# Patient Record
Sex: FEMALE | Race: WHITE
[De-identification: names, ages, dates, MRNs, and addresses within clinical notes are randomized per-mention and may not be internally consistent; named-entity substitution may affect disease eponyms.]

---

## 2018-05-11 ENCOUNTER — HOSPITAL ENCOUNTER (INPATIENT)
Dept: HOSPITAL 82 - ED | Age: 78
LOS: 1 days | Discharge: HOME | DRG: 310 | End: 2018-05-12
Attending: INTERNAL MEDICINE | Admitting: INTERNAL MEDICINE
Payer: MEDICARE

## 2018-05-11 VITALS — BODY MASS INDEX: 39.81 KG/M2 | WEIGHT: 197.5 LBS | HEIGHT: 59 IN

## 2018-05-11 VITALS — SYSTOLIC BLOOD PRESSURE: 100 MMHG | DIASTOLIC BLOOD PRESSURE: 52 MMHG

## 2018-05-11 VITALS — SYSTOLIC BLOOD PRESSURE: 137 MMHG | DIASTOLIC BLOOD PRESSURE: 68 MMHG

## 2018-05-11 VITALS — DIASTOLIC BLOOD PRESSURE: 71 MMHG | SYSTOLIC BLOOD PRESSURE: 131 MMHG

## 2018-05-11 VITALS — DIASTOLIC BLOOD PRESSURE: 76 MMHG | SYSTOLIC BLOOD PRESSURE: 110 MMHG

## 2018-05-11 VITALS — SYSTOLIC BLOOD PRESSURE: 119 MMHG | DIASTOLIC BLOOD PRESSURE: 75 MMHG

## 2018-05-11 VITALS — SYSTOLIC BLOOD PRESSURE: 127 MMHG | DIASTOLIC BLOOD PRESSURE: 64 MMHG

## 2018-05-11 VITALS — DIASTOLIC BLOOD PRESSURE: 79 MMHG | SYSTOLIC BLOOD PRESSURE: 118 MMHG

## 2018-05-11 VITALS — DIASTOLIC BLOOD PRESSURE: 63 MMHG | SYSTOLIC BLOOD PRESSURE: 138 MMHG

## 2018-05-11 VITALS — DIASTOLIC BLOOD PRESSURE: 62 MMHG | SYSTOLIC BLOOD PRESSURE: 107 MMHG

## 2018-05-11 VITALS — DIASTOLIC BLOOD PRESSURE: 65 MMHG | SYSTOLIC BLOOD PRESSURE: 131 MMHG

## 2018-05-11 VITALS — SYSTOLIC BLOOD PRESSURE: 114 MMHG | DIASTOLIC BLOOD PRESSURE: 73 MMHG

## 2018-05-11 VITALS — DIASTOLIC BLOOD PRESSURE: 71 MMHG | SYSTOLIC BLOOD PRESSURE: 117 MMHG

## 2018-05-11 DIAGNOSIS — I48.0: Primary | ICD-10-CM

## 2018-05-11 DIAGNOSIS — K21.9: ICD-10-CM

## 2018-05-11 DIAGNOSIS — Z23: ICD-10-CM

## 2018-05-11 DIAGNOSIS — J11.1: ICD-10-CM

## 2018-05-11 DIAGNOSIS — I10: ICD-10-CM

## 2018-05-11 LAB
ALBUMIN SERPL-MCNC: 4.1 G/DL (ref 3.2–5)
ALP SERPL-CCNC: 96 U/L (ref 38–126)
ALT SERPL-CCNC: 27 U/L (ref 11–66)
ANION GAP SERPL CALCULATED.3IONS-SCNC: 17 MMOL/L
APTT PPP: 28.8 SECONDS (ref 20–32.5)
AST SERPL-CCNC: 23 U/L (ref 9–36)
BASOPHILS NFR BLD AUTO: 1 % (ref 0–3)
BUN SERPL-MCNC: 14 MG/DL (ref 8–23)
BUN/CREAT SERPL: 20
CHLORIDE SERPL-SCNC: 105 MMOL/L (ref 95–108)
CO2 SERPL-SCNC: 25 MMOL/L (ref 22–30)
CREAT SERPL-MCNC: 0.7 MG/DL (ref 0.5–1)
EOSINOPHIL NFR BLD AUTO: 1 % (ref 0–8)
ERYTHROCYTE [DISTWIDTH] IN BLOOD BY AUTOMATED COUNT: 14.1 % (ref 11.5–15.5)
HCT VFR BLD AUTO: 41.1 % (ref 37–47)
HGB BLD-MCNC: 13.4 G/DL (ref 12–16)
IMM GRANULOCYTES NFR BLD: 0.1 % (ref 0–1)
INR PPP: 0.9 RATIO (ref 0.7–1.3)
LYMPHOCYTES NFR BLD: 36 % (ref 15–41)
MCH RBC QN AUTO: 27.4 PG  CALC (ref 26–32)
MCHC RBC AUTO-ENTMCNC: 32.6 G/L CALC (ref 32–36)
MCV RBC AUTO: 84 FL  CALC (ref 80–100)
MONOCYTES NFR BLD AUTO: 5 % (ref 2–13)
MYOGLOBIN SERPL-MCNC: 37 NG/ML (ref 0–62)
NEUTROPHILS # BLD AUTO: 4 THOU/UL (ref 2–7.15)
NEUTROPHILS NFR BLD AUTO: 57 % (ref 42–76)
PLATELET # BLD AUTO: 337 THOU/UL (ref 130–400)
POTASSIUM SERPL-SCNC: 3.8 MMOL/L (ref 3.5–5.1)
PROT SERPL-MCNC: 7.6 G/DL (ref 6.3–8.2)
PROTHROMBIN TIME: 10.3 SECONDS (ref 9–12.5)
RBC # BLD AUTO: 4.89 MILL/UL (ref 4.2–5.6)
SODIUM SERPL-SCNC: 143 MMOL/L (ref 137–146)

## 2018-05-11 NOTE — NUR
PT OOB TO BSC WITH ONE MOD ASSIST, CALL BELL WITHIN REACH, INSTRUCTED TO CALL
FOR ASSIST WHEN COMPLETE

## 2018-05-11 NOTE — NUR
INTO SEE PATIENT, PLAN OF CARE DISCUSSED, OFFERS NO COMPLAINTS OF
PAIN, CALL BELL WITHIN REACH, EDUCATED REGARDING MEAL TIMES AND DINNER TRAY
ORDERED, WILL CONTINUE TO MONITOR.

## 2018-05-11 NOTE — NUR
PT. ASSISTED TO BSC.  WITH EXERTION, PT. HR. ELEVATED -110'S A-FIB.
REMAINS ON CARDIZEM DRIP AT 15 MG/HR. PT. AMBULATORY WITH MIN ASSIST TO BSC.
APPROX 250 CC URINE OUT AT THIS TIME.  DENIES COMPLAINTS OF PAIN OR NEED AT
THIS TIME.

## 2018-05-11 NOTE — NUR
PT. MEDICATED PER PROVIDER ORDERS.  PROVIDED WITH ORANGE SHERBERT.  WATER
FRESHED UP.  OFFERED BSC, DECLINES AT THIS TIME.  DENIES OTHER COMPLAINTS OR
NEEDS.

## 2018-05-11 NOTE — NUR
PT C/O NC TICKLING NOSE. O2 BUMPED DOWN TO 1L. 
DAUGHTER AT BEDSIDE. PT HAS A DNR THAT NEEDS SIGNATURES. DAUGHTER IS TAKING IT
HOME TO COMPLETE IT & WILL BRING IT BACK.

## 2018-05-11 NOTE — NUR
FRANCO CARE PROVIDED, PT REPOSITIONED FOR COMFORT AND SET UP ASSIST PROVIDED FRO
PM MEAL, CALL PITT WITHIN REACH

## 2018-05-11 NOTE — NUR
2ND IV ESTABLISHED. LABS DRAWN. EKG COMPLETE. PORT XR COMPLETE. PT STATES 955
O2 IS "GOOD FOR HER", PLACED ON 2L NC. PT STATES SHE IS UNDER "EXTRA STRESS AT
HOME". 
PT C/O CP, SOB, NAUSEA, VOMITING, INCREASED HR SINCE 0900 THIS AM. PT TOOK
NITRO x3, TYLENOL x2, 5MG VALIUM PRIOR TO EMS ARRIVAL. EMS GAVE 20MG CARDIZEM,
PLACED PT ON 4L NC. . PT DENIES PAIN SINCE CARDIZEM GIVEN BY EMS. PT
TALKATIVE/ALERT WITH STAFF. 1+ EDEMA TO BILATERAL LEGS. SKIN DRY/WARM.
BREATHING EVEN/UNLABORED. MILD CRACKLES AT BASE OF BOTH LUNGS. ABD OBESE,
SOFT/NONTENDER.

## 2018-05-11 NOTE — NUR
Admission Note
 
 
Report Given to:         LAVELLE
Transported by:           Wheelchair          X Stretcher
 
Transported with:        X Nurse     Transporter   X Patent IV   X O2
                         X Cardiac Monitor

## 2018-05-11 NOTE — NUR
FAMILY LEFT AT THIS TIME, PLACED PT ON BED PAN, PT ABLE TO ROLL WELL IN BED
ASKING TO STAY ON PAN FOR A FEW MINUTES, CALL BELL WITHIN REACH.

## 2018-05-11 NOTE — NUR
PT BACK TO BED WITH ONE MOD ASSIST, CONTINENT  ML YELLOW URINE, SELF
FRANCO CARE COMPLETED, REPOSITIONED FOR COMFORT, CALL BELL WITHIN REACH, WILL
CONTINUE TO MONITOR.
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
## 2018-05-11 NOTE — NUR
PT. FOUND AWAKE, ALERT, ORIENTED X 3.  SKIN WARM AND DRY.  EDWAR.  AFEBRILE.
PT. ASSISTED TO BSC.  APPROX 250 CC OUT AT THIS TIME.  MODERATE INCONTINENCE
OF URINE NOTED AT THIS TIME.  ALL LINENS AND GOWN CHANGED.

## 2018-05-11 NOTE — NUR
PT. RESTING IN BED WITH EYES CLOSED.  DENIES COMPLAINTS OF PAIN OR NEED.
RESPS EVEN AND UNLABORED.  SKIN WARM AND DRY.  REMAINS A-FIB RATE CONTROLLED
IN THE 70-90'S.  CARDIZEM DRIP CONTINUES AT 15 MG/HR.  WILL CONTINUE TO
MONITOR.

## 2018-05-11 NOTE — NUR
CARDIZEM DRIP INCREASED TO 15 MG/HR AT THIS TIME AS HR REMAINS IN THE UPPER
'S IN A-RIB.  WILL CONTINUE TO ASSESS FOR TITRATION NEEDS AND RHYTHM
CHANGES.  DENIES OTHER COMPLAINTS AT THIS TIME.  WILL NOTIFY PHYSICIAN OF
REQUEST FOR OXYBUTYNIN AND TYLENOL.

## 2018-05-12 VITALS — DIASTOLIC BLOOD PRESSURE: 77 MMHG | SYSTOLIC BLOOD PRESSURE: 119 MMHG

## 2018-05-12 VITALS — DIASTOLIC BLOOD PRESSURE: 65 MMHG | SYSTOLIC BLOOD PRESSURE: 117 MMHG

## 2018-05-12 VITALS — SYSTOLIC BLOOD PRESSURE: 127 MMHG | DIASTOLIC BLOOD PRESSURE: 79 MMHG

## 2018-05-12 VITALS — SYSTOLIC BLOOD PRESSURE: 128 MMHG | DIASTOLIC BLOOD PRESSURE: 70 MMHG

## 2018-05-12 VITALS — SYSTOLIC BLOOD PRESSURE: 143 MMHG | DIASTOLIC BLOOD PRESSURE: 62 MMHG

## 2018-05-12 VITALS — DIASTOLIC BLOOD PRESSURE: 70 MMHG | SYSTOLIC BLOOD PRESSURE: 137 MMHG

## 2018-05-12 VITALS — SYSTOLIC BLOOD PRESSURE: 115 MMHG | DIASTOLIC BLOOD PRESSURE: 65 MMHG

## 2018-05-12 VITALS — SYSTOLIC BLOOD PRESSURE: 112 MMHG | DIASTOLIC BLOOD PRESSURE: 66 MMHG

## 2018-05-12 VITALS — DIASTOLIC BLOOD PRESSURE: 68 MMHG | SYSTOLIC BLOOD PRESSURE: 122 MMHG

## 2018-05-12 VITALS — DIASTOLIC BLOOD PRESSURE: 73 MMHG | SYSTOLIC BLOOD PRESSURE: 106 MMHG

## 2018-05-12 VITALS — DIASTOLIC BLOOD PRESSURE: 63 MMHG | SYSTOLIC BLOOD PRESSURE: 119 MMHG

## 2018-05-12 LAB
ALBUMIN SERPL-MCNC: 3.5 G/DL (ref 3.2–5)
ALP SERPL-CCNC: 94 U/L (ref 38–126)
ALT SERPL-CCNC: 25 U/L (ref 11–66)
ANION GAP SERPL CALCULATED.3IONS-SCNC: 15 MMOL/L
AST SERPL-CCNC: 19 U/L (ref 9–36)
BASOPHILS NFR BLD AUTO: 1 % (ref 0–3)
BASOPHILS NFR BLD AUTO: 1 % (ref 0–3)
BUN SERPL-MCNC: 12 MG/DL (ref 8–23)
BUN/CREAT SERPL: 20
CHLORIDE SERPL-SCNC: 106 MMOL/L (ref 95–108)
CO2 SERPL-SCNC: 25 MMOL/L (ref 22–30)
CREAT SERPL-MCNC: 0.6 MG/DL (ref 0.5–1)
EOSINOPHIL NFR BLD AUTO: 1 % (ref 0–8)
EOSINOPHIL NFR BLD AUTO: 2 % (ref 0–8)
ERYTHROCYTE [DISTWIDTH] IN BLOOD BY AUTOMATED COUNT: 14.3 % (ref 11.5–15.5)
ERYTHROCYTE [DISTWIDTH] IN BLOOD BY AUTOMATED COUNT: 14.4 % (ref 11.5–15.5)
HCT VFR BLD AUTO: 39 % (ref 37–47)
HCT VFR BLD AUTO: 39.9 % (ref 37–47)
HGB BLD-MCNC: 12.5 G/DL (ref 12–16)
HGB BLD-MCNC: 12.6 G/DL (ref 12–16)
IMM GRANULOCYTES NFR BLD: 0.2 % (ref 0–1)
IMM GRANULOCYTES NFR BLD: 0.2 % (ref 0–1)
LYMPHOCYTES NFR BLD: 43 % (ref 15–41)
LYMPHOCYTES NFR BLD: 44 % (ref 15–41)
MCH RBC QN AUTO: 27.2 PG  CALC (ref 26–32)
MCH RBC QN AUTO: 27.3 PG  CALC (ref 26–32)
MCHC RBC AUTO-ENTMCNC: 31.6 G/L CALC (ref 32–36)
MCHC RBC AUTO-ENTMCNC: 32.1 G/L CALC (ref 32–36)
MCV RBC AUTO: 85.2 FL  CALC (ref 80–100)
MCV RBC AUTO: 86 FL  CALC (ref 80–100)
MONOCYTES NFR BLD AUTO: 5 % (ref 2–13)
MONOCYTES NFR BLD AUTO: 6 % (ref 2–13)
NEUTROPHILS # BLD AUTO: 2.72 THOU/UL (ref 2–7.15)
NEUTROPHILS # BLD AUTO: 2.87 THOU/UL (ref 2–7.15)
NEUTROPHILS NFR BLD AUTO: 48 % (ref 42–76)
NEUTROPHILS NFR BLD AUTO: 50 % (ref 42–76)
PLATELET # BLD AUTO: 305 THOU/UL (ref 130–400)
PLATELET # BLD AUTO: 318 THOU/UL (ref 130–400)
POTASSIUM SERPL-SCNC: 3.8 MMOL/L (ref 3.5–5.1)
PROT SERPL-MCNC: 6.4 G/DL (ref 6.3–8.2)
RBC # BLD AUTO: 4.58 MILL/UL (ref 4.2–5.6)
RBC # BLD AUTO: 4.64 MILL/UL (ref 4.2–5.6)
SODIUM SERPL-SCNC: 142 MMOL/L (ref 137–146)

## 2018-05-12 PROCEDURE — 3E0234Z INTRODUCTION OF SERUM, TOXOID AND VACCINE INTO MUSCLE, PERCUTANEOUS APPROACH: ICD-10-PCS | Performed by: INTERNAL MEDICINE

## 2018-05-12 NOTE — NUR
PT IS AWAKE, ALERT, ORIENTED X 3.  MONITOR SHOWS SINUS RHYTHM WITHOUT ECTOPY,
RATE OF 70.  LUNGS CLEAR, ABDOMEN SOFT AND NONTENDER, PALPABLE PEDAL PULSES
WITH MINIMAL EDEMA.  PT SEEN BY JOE OGDEN THIS MORNING, PLAN OF CARE
REVIEWED.

## 2018-05-12 NOTE — NUR
PT HAS BEEN DISCHARGED TO HOME.  IVs WERE DISCONTINUED, MONITORS REMOVED.  PT
VERBALIZED UNDERSTANDING OF DC INSTRUCTIONS, TAKEN TO VEHICLE BY WHEELCHAIR.
PT LEAVES IN STABLE CONDITION, NSR.

## 2018-05-12 NOTE — NUR
PT SEEN BY DR KRISHNAMURTHY THIS AM, PLAN IS TO DISCHARGE PT HOME AS PER REGULAR
RHYTHM AND NO NEED FOR ANTICOAGULATION.  PT PLEASED AS SHE CARES FOR HER
AILING  AT HOME.  NO DISTRESS, NO COMPLAINTS.

## 2018-05-12 NOTE — NUR
LOPRESSOR PROVIDED THIS MORNING, THEN CARDIZEM DRIP WAS DISCONTINUED.  PT HAS
REMAINED IN SINUS RHYTHM, RATE 62.  PT HAS ALSO WASHED UP THIS MORNING, GOWN
CHANGED.

## 2018-05-12 NOTE — NUR
PT. ASSISTED TO BSC.  APPROX 200 CC URINE OUT AT THIS TIME.  MEDICATED FOR
6/10 BACK PAIN.  WILL CONTINUE TO MONITOR.  REMAINS SINUS RHTYHM AT THIS TIME.

## 2019-06-10 ENCOUNTER — HOSPITAL ENCOUNTER (OUTPATIENT)
Dept: HOSPITAL 82 - ED | Age: 79
Setting detail: OBSERVATION
LOS: 1 days | Discharge: HOME | End: 2019-06-11
Attending: INTERNAL MEDICINE | Admitting: INTERNAL MEDICINE
Payer: COMMERCIAL

## 2019-06-10 VITALS — DIASTOLIC BLOOD PRESSURE: 83 MMHG | SYSTOLIC BLOOD PRESSURE: 142 MMHG

## 2019-06-10 VITALS — DIASTOLIC BLOOD PRESSURE: 74 MMHG | SYSTOLIC BLOOD PRESSURE: 101 MMHG

## 2019-06-10 VITALS — DIASTOLIC BLOOD PRESSURE: 63 MMHG | SYSTOLIC BLOOD PRESSURE: 116 MMHG

## 2019-06-10 VITALS — BODY MASS INDEX: 37.56 KG/M2 | WEIGHT: 186.29 LBS | HEIGHT: 59 IN

## 2019-06-10 VITALS — SYSTOLIC BLOOD PRESSURE: 110 MMHG | DIASTOLIC BLOOD PRESSURE: 65 MMHG

## 2019-06-10 VITALS — DIASTOLIC BLOOD PRESSURE: 67 MMHG | SYSTOLIC BLOOD PRESSURE: 147 MMHG

## 2019-06-10 VITALS — SYSTOLIC BLOOD PRESSURE: 108 MMHG | DIASTOLIC BLOOD PRESSURE: 62 MMHG

## 2019-06-10 VITALS — SYSTOLIC BLOOD PRESSURE: 132 MMHG | DIASTOLIC BLOOD PRESSURE: 72 MMHG

## 2019-06-10 VITALS — DIASTOLIC BLOOD PRESSURE: 68 MMHG | SYSTOLIC BLOOD PRESSURE: 106 MMHG

## 2019-06-10 VITALS — DIASTOLIC BLOOD PRESSURE: 56 MMHG | SYSTOLIC BLOOD PRESSURE: 114 MMHG

## 2019-06-10 VITALS — DIASTOLIC BLOOD PRESSURE: 63 MMHG | SYSTOLIC BLOOD PRESSURE: 140 MMHG

## 2019-06-10 VITALS — DIASTOLIC BLOOD PRESSURE: 59 MMHG | SYSTOLIC BLOOD PRESSURE: 127 MMHG

## 2019-06-10 VITALS — DIASTOLIC BLOOD PRESSURE: 75 MMHG | SYSTOLIC BLOOD PRESSURE: 154 MMHG

## 2019-06-10 VITALS — DIASTOLIC BLOOD PRESSURE: 64 MMHG | SYSTOLIC BLOOD PRESSURE: 110 MMHG

## 2019-06-10 VITALS — SYSTOLIC BLOOD PRESSURE: 144 MMHG | DIASTOLIC BLOOD PRESSURE: 61 MMHG

## 2019-06-10 VITALS — SYSTOLIC BLOOD PRESSURE: 119 MMHG | DIASTOLIC BLOOD PRESSURE: 60 MMHG

## 2019-06-10 VITALS — SYSTOLIC BLOOD PRESSURE: 115 MMHG | DIASTOLIC BLOOD PRESSURE: 58 MMHG

## 2019-06-10 DIAGNOSIS — I10: ICD-10-CM

## 2019-06-10 DIAGNOSIS — E66.9: ICD-10-CM

## 2019-06-10 DIAGNOSIS — R53.1: ICD-10-CM

## 2019-06-10 DIAGNOSIS — K21.9: ICD-10-CM

## 2019-06-10 DIAGNOSIS — M19.90: ICD-10-CM

## 2019-06-10 DIAGNOSIS — I48.0: Primary | ICD-10-CM

## 2019-06-10 DIAGNOSIS — Z98.890: ICD-10-CM

## 2019-06-10 DIAGNOSIS — R07.9: ICD-10-CM

## 2019-06-10 LAB
ANION GAP SERPL CALCULATED.3IONS-SCNC: 15 MMOL/L
BASOPHILS NFR BLD AUTO: 0 % (ref 0–3)
BUN SERPL-MCNC: 10 MG/DL (ref 8–23)
BUN/CREAT SERPL: 16
CHLORIDE SERPL-SCNC: 106 MMOL/L (ref 95–108)
CO2 SERPL-SCNC: 24 MMOL/L (ref 22–30)
CREAT SERPL-MCNC: 0.6 MG/DL (ref 0.5–1)
EOSINOPHIL NFR BLD AUTO: 1 % (ref 0–8)
ERYTHROCYTE [DISTWIDTH] IN BLOOD BY AUTOMATED COUNT: 13.8 % (ref 11.5–15.5)
HCT VFR BLD AUTO: 40.2 % (ref 37–47)
HGB BLD-MCNC: 12.9 G/DL (ref 12–16)
IMM GRANULOCYTES NFR BLD: 0.3 % (ref 0–5)
LYMPHOCYTES NFR BLD: 34 % (ref 15–41)
MCH RBC QN AUTO: 26.8 PG  CALC (ref 26–32)
MCHC RBC AUTO-ENTMCNC: 32.1 G/L CALC (ref 32–36)
MCV RBC AUTO: 83.6 FL  CALC (ref 80–100)
MONOCYTES NFR BLD AUTO: 6 % (ref 2–13)
NEUTROPHILS # BLD AUTO: 4.19 THOU/UL (ref 2–7.15)
NEUTROPHILS NFR BLD AUTO: 59 % (ref 42–76)
PLATELET # BLD AUTO: 338 THOU/UL (ref 130–400)
POTASSIUM SERPL-SCNC: 3.6 MMOL/L (ref 3.5–5.1)
RBC # BLD AUTO: 4.81 MILL/UL (ref 4.2–5.6)
SODIUM SERPL-SCNC: 142 MMOL/L (ref 137–146)

## 2019-06-10 NOTE — NUR
PT TO ICU BED 1 VIA STRETCHER ACCOMPANIED BY ER NURSE. PT TRANSFERRED TO BED
WITH STAND BY ASSIST. ADDMISSION ASSESSMENT COMPLETED AT THIS TIME. PT IS
ALERT AND ORIENTED X3. ORIENTED TO ROOM AND UNIT AND PLACED ON ALL MONITORS.
PT STATES THAT  IS IN HOSPITAL AND HAS AN ACTIVE RESTRAINING ORDER
AGAINST  AND WANTS TO MAKE IT KNOWN THAT HE IS NOT TO COME AND VISIT.
EXPALINED THAT I WILL PASS ON IN REPORT. CALL LIGHT IN REACH. WILL CONTINUE TO
MONITOR.

## 2019-06-10 NOTE — NUR
ASSESSMENT COMPLETE. PT UP TO BSC X 1 PERSON ASSIST; INCONTINENT OF LARGE
AMOUNT OF URINE; USES PULL UP BRIEF. STATES THAT HEADACHE IS BETTER AND DENIES
PAIN CURRENTLY. RESPIRATIONS EVEN AND UNLABORED ON ROOM AIR. CARDIZEM INFUSING
AT 5MG/HR ALONG WITH MAINENANCE FLUIDS; EMS IV SITE APPEARS HEALTHY TO LAC.
ASSESSMENT NEGATIVE EXCEPT FOR IRREGULAR HEART RATE AND TENDERNESS TO LEGS
WITH PALPATION. PLAN OF CARE REVIEWED. PT ENCOURAGED TO VERBALIZE CONCERNS.
STATES UNDERSTANDING. SAFETY MEASURES IN PLACE. CALL LIGHT WITHIN REACH.

## 2019-06-10 NOTE — NUR
VS STABLE; MONITORING HOURLY. PT TALKATIVE ABOUT FAMILY. ASSISTED WITH
REPOSITIONING FOR COMFORT. ONLY REQUEST IS FOR WATER AT BEDSIDE FOR DRY MOUTH
R/T HOME MEDICATION.

## 2019-06-10 NOTE — NUR
PT REQUESTING TO SIT UP IN HIGHER IN THE BED STATING THAT SHE ONLY SLEEPS A
FEW HOURS AT A TIME. DENIES PAIN. RESPIRATIONS EVEN AND UNLABORED ON ROOM AIR.
REMAINS AFIB ON TELEMETRY. CARDIZEM CONTINUES TO INFUSE AT 5MG/HR; IV SITE
APPEARS HEALTHY. SAFETY MEASURES IN PLACE. CALL LIGHT WITHIN REACH.

## 2019-06-11 VITALS — DIASTOLIC BLOOD PRESSURE: 56 MMHG | SYSTOLIC BLOOD PRESSURE: 162 MMHG

## 2019-06-11 VITALS — DIASTOLIC BLOOD PRESSURE: 58 MMHG | SYSTOLIC BLOOD PRESSURE: 119 MMHG

## 2019-06-11 VITALS — DIASTOLIC BLOOD PRESSURE: 71 MMHG | SYSTOLIC BLOOD PRESSURE: 139 MMHG

## 2019-06-11 VITALS — DIASTOLIC BLOOD PRESSURE: 75 MMHG | SYSTOLIC BLOOD PRESSURE: 143 MMHG

## 2019-06-11 VITALS — DIASTOLIC BLOOD PRESSURE: 64 MMHG | SYSTOLIC BLOOD PRESSURE: 133 MMHG

## 2019-06-11 VITALS — DIASTOLIC BLOOD PRESSURE: 66 MMHG | SYSTOLIC BLOOD PRESSURE: 123 MMHG

## 2019-06-11 VITALS — DIASTOLIC BLOOD PRESSURE: 71 MMHG | SYSTOLIC BLOOD PRESSURE: 146 MMHG

## 2019-06-11 VITALS — SYSTOLIC BLOOD PRESSURE: 142 MMHG | DIASTOLIC BLOOD PRESSURE: 75 MMHG

## 2019-06-11 VITALS — SYSTOLIC BLOOD PRESSURE: 127 MMHG | DIASTOLIC BLOOD PRESSURE: 56 MMHG

## 2019-06-11 VITALS — SYSTOLIC BLOOD PRESSURE: 118 MMHG | DIASTOLIC BLOOD PRESSURE: 63 MMHG

## 2019-06-11 VITALS — SYSTOLIC BLOOD PRESSURE: 123 MMHG | DIASTOLIC BLOOD PRESSURE: 62 MMHG

## 2019-06-11 VITALS — DIASTOLIC BLOOD PRESSURE: 63 MMHG | SYSTOLIC BLOOD PRESSURE: 120 MMHG

## 2019-06-11 VITALS — DIASTOLIC BLOOD PRESSURE: 59 MMHG | SYSTOLIC BLOOD PRESSURE: 115 MMHG

## 2019-06-11 VITALS — DIASTOLIC BLOOD PRESSURE: 61 MMHG | SYSTOLIC BLOOD PRESSURE: 115 MMHG

## 2019-06-11 VITALS — DIASTOLIC BLOOD PRESSURE: 58 MMHG | SYSTOLIC BLOOD PRESSURE: 133 MMHG

## 2019-06-11 VITALS — DIASTOLIC BLOOD PRESSURE: 73 MMHG | SYSTOLIC BLOOD PRESSURE: 169 MMHG

## 2019-06-11 VITALS — DIASTOLIC BLOOD PRESSURE: 65 MMHG | SYSTOLIC BLOOD PRESSURE: 148 MMHG

## 2019-06-11 VITALS — SYSTOLIC BLOOD PRESSURE: 118 MMHG | DIASTOLIC BLOOD PRESSURE: 60 MMHG

## 2019-06-11 LAB
ALBUMIN SERPL-MCNC: 3.8 G/DL (ref 3.2–5)
ALP SERPL-CCNC: 74 U/L (ref 38–126)
ANION GAP SERPL CALCULATED.3IONS-SCNC: 13 MMOL/L
AST SERPL-CCNC: 13 U/L (ref 9–36)
BASOPHILS NFR BLD AUTO: 1 % (ref 0–3)
BUN SERPL-MCNC: 14 MG/DL (ref 8–23)
BUN/CREAT SERPL: 20
CHLORIDE SERPL-SCNC: 107 MMOL/L (ref 95–108)
CO2 SERPL-SCNC: 25 MMOL/L (ref 22–30)
CREAT SERPL-MCNC: 0.7 MG/DL (ref 0.5–1)
EOSINOPHIL NFR BLD AUTO: 1 % (ref 0–8)
ERYTHROCYTE [DISTWIDTH] IN BLOOD BY AUTOMATED COUNT: 14 % (ref 11.5–15.5)
HCT VFR BLD AUTO: 37 % (ref 37–47)
HGB BLD-MCNC: 11.6 G/DL (ref 12–16)
IMM GRANULOCYTES NFR BLD: 0.2 % (ref 0–5)
LYMPHOCYTES NFR BLD: 37 % (ref 15–41)
MAGNESIUM SERPL-MCNC: 1.9 MG/DL (ref 1.6–2.3)
MCH RBC QN AUTO: 26.6 PG  CALC (ref 26–32)
MCHC RBC AUTO-ENTMCNC: 31.4 G/L CALC (ref 32–36)
MCV RBC AUTO: 84.9 FL  CALC (ref 80–100)
MONOCYTES NFR BLD AUTO: 6 % (ref 2–13)
NEUTROPHILS # BLD AUTO: 3.18 THOU/UL (ref 2–7.15)
NEUTROPHILS NFR BLD AUTO: 55 % (ref 42–76)
PLATELET # BLD AUTO: 301 THOU/UL (ref 130–400)
POTASSIUM SERPL-SCNC: 4.3 MMOL/L (ref 3.5–5.1)
PROT SERPL-MCNC: 6.2 G/DL (ref 6.3–8.2)
RBC # BLD AUTO: 4.36 MILL/UL (ref 4.2–5.6)
SODIUM SERPL-SCNC: 140 MMOL/L (ref 137–146)

## 2019-06-11 NOTE — NUR
IV DC'D, TIP INTACT, DRESSING APPLIED. PT STATES HER SON WONT BE ABLE TO COME
GET HER UNTIL NOON. PT LEFT ON MONITORS AT THIS TIME. PT IS VERY TALKATIVE.

## 2019-06-11 NOTE — NUR
PT ASLEEP WITH NO SIGNS OF DISTRESS. RESPIRATIONS EVEN AND UNLABORED ON ROOM
AIR. 95% ON ROOM AIR. VS STABLE. SINUS RHYTHM ON TELE. SAFETY MEASURES IN
PLACE. CALL LIGHT WITHIN REACH.

## 2019-06-11 NOTE — NUR
INCONTINENT OF LARGE AMOUNT OF URINE; ASSISTED TO BSC FOR HYGIENE. PT IS ONE
PERSON ASSIST; VERY SLOW WITH MOVEMENTS; REPORTS THAT SHE USES A WHEELCHAIR
AND WALKER AT HOME. STATES THAT SHE HAS ONLY FALLEN ONCE IN THE LAST 3 YEARS.

## 2019-06-11 NOTE — NUR
PT SITTING ON SIDE OF BED, EATING LUNCH. CHANGED FROM GOWN TO PERSONAL CLOTHES
WITH MINIMAL HELP. EDUCATED PT ON DC INSTRUCTIONS, INCLUDING NEW RX & STOP
LISINOPRIL, & F/UP WITH CARDIO. PT VERBALIZED UNDERSTANDING.

## 2019-06-11 NOTE — NUR
PT A&Ox4. PLEASANT, COOPERATIVE. DENIES ANY PAIN. KING, BUT IS SLOW MOVING.
STRONG PULSES. OBESE BUT NO EDEMA. -3 SEC CAP REFILL. SR ON TELE @63. SPEECH
CLEAR. EYES PERRLA @3. ABD SOFT/NONTENDER. DENIES N/V/D. ACTIVE BS. BREATHING
EVEN/UNLABORED, LUNGS CTA. PT STATES SHE IS READY TO GO HOME TODAY. PT GIVEN
DR LUND'S INFORMATION.

## 2019-06-11 NOTE — NUR
PT SITTING UP IN BED WATCHING TV; ALERT AND ORIENTED. DENIES PAIN.
RESPIRATIONS EVEN AND UNLABORED ON ROOM AIR. NO REQUESTS OR CONCERNS AT THIS
TIME. SAFETY MEASURES IN PLACE. CALL LIGHT WITHIN REACH.

## 2019-11-13 ENCOUNTER — HOSPITAL ENCOUNTER (EMERGENCY)
Dept: HOSPITAL 82 - ED | Age: 79
Discharge: HOME | End: 2019-11-13
Payer: MEDICAID

## 2019-11-13 VITALS — SYSTOLIC BLOOD PRESSURE: 147 MMHG | DIASTOLIC BLOOD PRESSURE: 88 MMHG

## 2019-11-13 VITALS — WEIGHT: 143.3 LBS | BODY MASS INDEX: 28.89 KG/M2 | HEIGHT: 59 IN

## 2019-11-13 DIAGNOSIS — N32.81: Primary | ICD-10-CM

## 2019-11-13 DIAGNOSIS — I10: ICD-10-CM

## 2019-11-13 DIAGNOSIS — K05.10: ICD-10-CM

## 2019-11-13 LAB
BILIRUB UR QL STRIP.AUTO: NEGATIVE
COLOR UR AUTO: YELLOW
GLUCOSE UR STRIP.AUTO-MCNC: NEGATIVE MG/DL
HGB UR QL STRIP.AUTO: NEGATIVE
KETONES UR STRIP.AUTO-MCNC: NEGATIVE MG/DL
LEUKOCYTE ESTERASE UR QL STRIP.AUTO: NEGATIVE
NITRITE UR QL STRIP.AUTO: NEGATIVE
PH UR STRIP.AUTO: 6 [PH] (ref 4.5–8)
PROT UR QL STRIP.AUTO: NEGATIVE MG/DL
SP GR UR STRIP.AUTO: 1.02
UROBILINOGEN UR QL STRIP.AUTO: 0.2 E.U./DL

## 2020-07-31 ENCOUNTER — HOSPITAL ENCOUNTER (OUTPATIENT)
Dept: HOSPITAL 82 - ORM | Age: 80
Discharge: HOME | End: 2020-07-31
Payer: MEDICARE

## 2020-07-31 VITALS — HEIGHT: 59 IN | WEIGHT: 185.19 LBS | BODY MASS INDEX: 37.33 KG/M2

## 2020-07-31 VITALS — DIASTOLIC BLOOD PRESSURE: 80 MMHG | SYSTOLIC BLOOD PRESSURE: 177 MMHG

## 2020-07-31 DIAGNOSIS — Z79.899: ICD-10-CM

## 2020-07-31 DIAGNOSIS — Z87.440: ICD-10-CM

## 2020-07-31 DIAGNOSIS — N39.46: Primary | ICD-10-CM

## 2020-07-31 DIAGNOSIS — Z11.59: ICD-10-CM

## 2020-07-31 DIAGNOSIS — E66.9: ICD-10-CM

## 2020-07-31 DIAGNOSIS — N32.81: ICD-10-CM

## 2020-07-31 PROCEDURE — 3E0K8GC INTRODUCTION OF OTHER THERAPEUTIC SUBSTANCE INTO GENITOURINARY TRACT, VIA NATURAL OR ARTIFICIAL OPENING ENDOSCOPIC: ICD-10-PCS

## 2020-08-14 ENCOUNTER — HOSPITAL ENCOUNTER (EMERGENCY)
Dept: HOSPITAL 82 - ED | Age: 80
Discharge: HOME | End: 2020-08-14
Payer: MEDICARE

## 2020-08-14 VITALS — BODY MASS INDEX: 37.78 KG/M2 | HEIGHT: 59 IN | WEIGHT: 187.39 LBS

## 2020-08-14 VITALS — SYSTOLIC BLOOD PRESSURE: 166 MMHG | DIASTOLIC BLOOD PRESSURE: 72 MMHG

## 2020-08-14 DIAGNOSIS — I10: ICD-10-CM

## 2020-08-14 DIAGNOSIS — B96.5: ICD-10-CM

## 2020-08-14 DIAGNOSIS — N39.0: Primary | ICD-10-CM

## 2020-08-14 DIAGNOSIS — R06.02: ICD-10-CM

## 2020-08-14 DIAGNOSIS — Z20.828: ICD-10-CM

## 2020-08-14 LAB
ALBUMIN SERPL-MCNC: 4.3 G/DL (ref 3.2–5)
ALP SERPL-CCNC: 155 U/L (ref 38–126)
ANION GAP SERPL CALCULATED.3IONS-SCNC: 13 MMOL/L
AST SERPL-CCNC: 29 U/L (ref 9–36)
BASOPHILS NFR BLD AUTO: 0 % (ref 0–3)
BILIRUB UR QL STRIP.AUTO: NEGATIVE
BUN SERPL-MCNC: 14 MG/DL (ref 8–23)
BUN/CREAT SERPL: 22
CHLORIDE SERPL-SCNC: 105 MMOL/L (ref 95–108)
CO2 SERPL-SCNC: 23 MMOL/L (ref 22–30)
COLOR UR AUTO: YELLOW
CREAT SERPL-MCNC: 0.6 MG/DL (ref 0.5–1)
EOSINOPHIL NFR BLD AUTO: 1 % (ref 0–8)
ERYTHROCYTE [DISTWIDTH] IN BLOOD BY AUTOMATED COUNT: 14.3 % (ref 11.5–15.5)
GLUCOSE UR STRIP.AUTO-MCNC: NEGATIVE MG/DL
HCT VFR BLD AUTO: 39.1 % (ref 37–47)
HGB BLD-MCNC: 12 G/DL (ref 12–16)
HGB UR QL STRIP.AUTO: (no result)
IMM GRANULOCYTES NFR BLD: 0.6 % (ref 0–5)
KETONES UR STRIP.AUTO-MCNC: NEGATIVE MG/DL
LEUKOCYTE ESTERASE UR QL STRIP.AUTO: NEGATIVE
LYMPHOCYTES NFR BLD: 26 % (ref 15–41)
MCH RBC QN AUTO: 26 PG  CALC (ref 26–32)
MCHC RBC AUTO-ENTMCNC: 30.7 G/DL CAL (ref 32–36)
MCV RBC AUTO: 84.8 FL  CALC (ref 80–100)
MONOCYTES NFR BLD AUTO: 5 % (ref 2–13)
NEUTROPHILS # BLD AUTO: 6.45 THOU/UL (ref 2–7.15)
NEUTROPHILS NFR BLD AUTO: 67 % (ref 42–76)
NITRITE UR QL STRIP.AUTO: NEGATIVE
PH UR STRIP.AUTO: 5.5 [PH] (ref 4.5–8)
PLATELET # BLD AUTO: 385 THOU/UL (ref 130–400)
POTASSIUM SERPL-SCNC: 3.8 MMOL/L (ref 3.5–5.1)
PROT SERPL-MCNC: 7.2 G/DL (ref 6.3–8.2)
PROT UR QL STRIP.AUTO: NEGATIVE MG/DL
RBC # BLD AUTO: 4.61 MILL/UL (ref 4.2–5.6)
SODIUM SERPL-SCNC: 137 MMOL/L (ref 137–146)
SP GR UR STRIP.AUTO: 1.02
UROBILINOGEN UR QL STRIP.AUTO: 0.2 E.U./DL

## 2020-09-13 ENCOUNTER — HOSPITAL ENCOUNTER (INPATIENT)
Dept: HOSPITAL 82 - ED | Age: 80
LOS: 2 days | Discharge: HOME | DRG: 309 | End: 2020-09-15
Attending: INTERNAL MEDICINE | Admitting: INTERNAL MEDICINE
Payer: MEDICARE

## 2020-09-13 VITALS — SYSTOLIC BLOOD PRESSURE: 126 MMHG | DIASTOLIC BLOOD PRESSURE: 74 MMHG

## 2020-09-13 VITALS — BODY MASS INDEX: 41.93 KG/M2 | HEIGHT: 59 IN | WEIGHT: 208 LBS

## 2020-09-13 VITALS — DIASTOLIC BLOOD PRESSURE: 73 MMHG | SYSTOLIC BLOOD PRESSURE: 121 MMHG

## 2020-09-13 DIAGNOSIS — E11.9: ICD-10-CM

## 2020-09-13 DIAGNOSIS — I48.0: Primary | ICD-10-CM

## 2020-09-13 DIAGNOSIS — Z20.828: ICD-10-CM

## 2020-09-13 DIAGNOSIS — K21.9: ICD-10-CM

## 2020-09-13 DIAGNOSIS — Z91.128: ICD-10-CM

## 2020-09-13 DIAGNOSIS — N39.0: ICD-10-CM

## 2020-09-13 DIAGNOSIS — L03.115: ICD-10-CM

## 2020-09-13 DIAGNOSIS — T50.996A: ICD-10-CM

## 2020-09-13 DIAGNOSIS — I10: ICD-10-CM

## 2020-09-13 DIAGNOSIS — L03.116: ICD-10-CM

## 2020-09-13 DIAGNOSIS — E87.6: ICD-10-CM

## 2020-09-13 DIAGNOSIS — I87.2: ICD-10-CM

## 2020-09-13 LAB
ALBUMIN SERPL-MCNC: 4.3 G/DL (ref 3.2–5)
ALP SERPL-CCNC: 117 U/L (ref 38–126)
ANION GAP SERPL CALCULATED.3IONS-SCNC: 13 MMOL/L
APTT PPP: 26.2 SECONDS (ref 20–32.5)
AST SERPL-CCNC: 26 U/L (ref 9–36)
BASOPHILS NFR BLD AUTO: 1 % (ref 0–3)
BUN SERPL-MCNC: 13 MG/DL (ref 8–23)
BUN/CREAT SERPL: 19
CHLORIDE SERPL-SCNC: 109 MMOL/L (ref 95–108)
CO2 SERPL-SCNC: 24 MMOL/L (ref 22–30)
CREAT SERPL-MCNC: 0.7 MG/DL (ref 0.5–1)
EOSINOPHIL NFR BLD AUTO: 2 % (ref 0–8)
ERYTHROCYTE [DISTWIDTH] IN BLOOD BY AUTOMATED COUNT: 14.1 % (ref 11.5–15.5)
HCT VFR BLD AUTO: 41.9 % (ref 37–47)
HGB BLD-MCNC: 12.8 G/DL (ref 12–16)
IMM GRANULOCYTES NFR BLD: 0.3 % (ref 0–5)
INR PPP: 1 RATIO (ref 0.7–1.3)
LYMPHOCYTES NFR BLD: 29 % (ref 15–41)
MCH RBC QN AUTO: 25.9 PG  CALC (ref 26–32)
MCHC RBC AUTO-ENTMCNC: 30.5 G/DL CAL (ref 32–36)
MCV RBC AUTO: 84.6 FL  CALC (ref 80–100)
MONOCYTES NFR BLD AUTO: 6 % (ref 2–13)
MYOGLOBIN SERPL-MCNC: 22 NG/ML (ref 0–62)
NEUTROPHILS # BLD AUTO: 4.87 THOU/UL (ref 2–7.15)
NEUTROPHILS NFR BLD AUTO: 63 % (ref 42–76)
PLATELET # BLD AUTO: 309 THOU/UL (ref 130–400)
POTASSIUM SERPL-SCNC: 3.4 MMOL/L (ref 3.5–5.1)
PROT SERPL-MCNC: 7.4 G/DL (ref 6.3–8.2)
PROTHROMBIN TIME: 9.9 SECONDS (ref 9–12.5)
RBC # BLD AUTO: 4.95 MILL/UL (ref 4.2–5.6)
SODIUM SERPL-SCNC: 142 MMOL/L (ref 137–146)

## 2020-09-13 NOTE — NUR
Admission Note
 
Report Given to:         MARY ELLEN MARTINEZ
Transported by:           Wheelchair          X Stretcher
 
Transported with:        X Nurse     Transporter   X Patent IV    O2
                         X Cardiac Monitor
 
Location:                X ICU       MS2

## 2020-09-13 NOTE — NUR
ADMISSION AND PHYSICAL ASSESMENT COMPLETE. PLAN OF CARE REVIEWED. PT
VERBALIZES UNDERSTANDING AND DENIES QUESTIONS. WHEN ASKED PT REQUEST WATER AND
EXTRA BLANKETS. PROVIDED PER PTS REQUEST. PT DENIES FURTHER NEEDS AT THIS
TIME. ITEMS WITHIN REACH. BED LOCKED AND IN LOW POSITION W/ BED RAILS UP X2.
CALL BELL WITHIN REACH, AGREES TO CALL PRN.

## 2020-09-13 NOTE — NUR
PT ARRIVES TO UNIT VIA STRETCHER, ACCOMPANIED BY ARELI LACY RN. PT AMBULATORY
TO BED W/ WALKER AND ASSIST OF 1. GAIT IS WEAK AND UNSTEADY. PT AGREES TO USE
CALL PITT AND NOT ATTEMPT TO GET OOB ALONE. PT ORIENTED TO ROOM, BED CONTROLS,
TV/LIGHT/CALL BELL SYSTEM. CALL BELL WITHIN REACH, AGREES TO CALL PRN.
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
## 2020-09-13 NOTE — NUR
PATIENT TO ROOM 10 VIA EMS STRETCHER. UNDRESSED INTO A GOWN. TRIAGE COMPLETED
AT BEDSIDE. PATIENT PLACED ON MONITOR. MD AT BEDSIDE FOR EVAL.

## 2020-09-14 VITALS — SYSTOLIC BLOOD PRESSURE: 125 MMHG | DIASTOLIC BLOOD PRESSURE: 58 MMHG

## 2020-09-14 VITALS — SYSTOLIC BLOOD PRESSURE: 112 MMHG | DIASTOLIC BLOOD PRESSURE: 70 MMHG

## 2020-09-14 VITALS — DIASTOLIC BLOOD PRESSURE: 64 MMHG | SYSTOLIC BLOOD PRESSURE: 104 MMHG

## 2020-09-14 VITALS — SYSTOLIC BLOOD PRESSURE: 108 MMHG | DIASTOLIC BLOOD PRESSURE: 59 MMHG

## 2020-09-14 VITALS — DIASTOLIC BLOOD PRESSURE: 76 MMHG | SYSTOLIC BLOOD PRESSURE: 140 MMHG

## 2020-09-14 VITALS — SYSTOLIC BLOOD PRESSURE: 121 MMHG | DIASTOLIC BLOOD PRESSURE: 62 MMHG

## 2020-09-14 VITALS — SYSTOLIC BLOOD PRESSURE: 149 MMHG | DIASTOLIC BLOOD PRESSURE: 54 MMHG

## 2020-09-14 VITALS — DIASTOLIC BLOOD PRESSURE: 57 MMHG | SYSTOLIC BLOOD PRESSURE: 98 MMHG

## 2020-09-14 VITALS — DIASTOLIC BLOOD PRESSURE: 60 MMHG | SYSTOLIC BLOOD PRESSURE: 113 MMHG

## 2020-09-14 VITALS — SYSTOLIC BLOOD PRESSURE: 148 MMHG | DIASTOLIC BLOOD PRESSURE: 74 MMHG

## 2020-09-14 VITALS — DIASTOLIC BLOOD PRESSURE: 70 MMHG | SYSTOLIC BLOOD PRESSURE: 117 MMHG

## 2020-09-14 VITALS — DIASTOLIC BLOOD PRESSURE: 61 MMHG | SYSTOLIC BLOOD PRESSURE: 138 MMHG

## 2020-09-14 VITALS — SYSTOLIC BLOOD PRESSURE: 100 MMHG | DIASTOLIC BLOOD PRESSURE: 61 MMHG

## 2020-09-14 VITALS — SYSTOLIC BLOOD PRESSURE: 124 MMHG | DIASTOLIC BLOOD PRESSURE: 64 MMHG

## 2020-09-14 VITALS — SYSTOLIC BLOOD PRESSURE: 104 MMHG | DIASTOLIC BLOOD PRESSURE: 61 MMHG

## 2020-09-14 VITALS — SYSTOLIC BLOOD PRESSURE: 117 MMHG | DIASTOLIC BLOOD PRESSURE: 62 MMHG

## 2020-09-14 VITALS — DIASTOLIC BLOOD PRESSURE: 71 MMHG | SYSTOLIC BLOOD PRESSURE: 131 MMHG

## 2020-09-14 VITALS — SYSTOLIC BLOOD PRESSURE: 119 MMHG | DIASTOLIC BLOOD PRESSURE: 63 MMHG

## 2020-09-14 VITALS — SYSTOLIC BLOOD PRESSURE: 134 MMHG | DIASTOLIC BLOOD PRESSURE: 71 MMHG

## 2020-09-14 VITALS — SYSTOLIC BLOOD PRESSURE: 146 MMHG | DIASTOLIC BLOOD PRESSURE: 83 MMHG

## 2020-09-14 VITALS — SYSTOLIC BLOOD PRESSURE: 136 MMHG | DIASTOLIC BLOOD PRESSURE: 62 MMHG

## 2020-09-14 VITALS — DIASTOLIC BLOOD PRESSURE: 67 MMHG | SYSTOLIC BLOOD PRESSURE: 116 MMHG

## 2020-09-14 VITALS — DIASTOLIC BLOOD PRESSURE: 70 MMHG | SYSTOLIC BLOOD PRESSURE: 131 MMHG

## 2020-09-14 VITALS — DIASTOLIC BLOOD PRESSURE: 67 MMHG | SYSTOLIC BLOOD PRESSURE: 158 MMHG

## 2020-09-14 VITALS — DIASTOLIC BLOOD PRESSURE: 66 MMHG | SYSTOLIC BLOOD PRESSURE: 113 MMHG

## 2020-09-14 VITALS — DIASTOLIC BLOOD PRESSURE: 57 MMHG | SYSTOLIC BLOOD PRESSURE: 117 MMHG

## 2020-09-14 VITALS — SYSTOLIC BLOOD PRESSURE: 126 MMHG | DIASTOLIC BLOOD PRESSURE: 85 MMHG

## 2020-09-14 VITALS — SYSTOLIC BLOOD PRESSURE: 96 MMHG | DIASTOLIC BLOOD PRESSURE: 48 MMHG

## 2020-09-14 VITALS — DIASTOLIC BLOOD PRESSURE: 59 MMHG | SYSTOLIC BLOOD PRESSURE: 121 MMHG

## 2020-09-14 VITALS — SYSTOLIC BLOOD PRESSURE: 135 MMHG | DIASTOLIC BLOOD PRESSURE: 74 MMHG

## 2020-09-14 VITALS — DIASTOLIC BLOOD PRESSURE: 67 MMHG | SYSTOLIC BLOOD PRESSURE: 125 MMHG

## 2020-09-14 LAB
BACTERIA #/AREA URNS HPF: (no result) HPF
BILIRUB UR QL STRIP.AUTO: NEGATIVE
CHOLEST SERPL-MCNC: 213 MG/DL (ref 0–199)
CHOLEST/HDLC SERPL: 4.4 {RATIO}
COLOR UR AUTO: (no result)
GLUCOSE UR STRIP.AUTO-MCNC: 100 MG/DL
HDLC SERPL-MCNC: 48 MG/DL (ref 40–?)
HGB UR QL STRIP.AUTO: NEGATIVE
KETONES UR STRIP.AUTO-MCNC: NEGATIVE MG/DL
LDLC SERPL CALC-MCNC: 129 MG/DL
LEUKOCYTE ESTERASE UR QL STRIP.AUTO: (no result)
MAGNESIUM SERPL-MCNC: 1.9 MG/DL (ref 1.6–2.3)
NITRITE UR QL STRIP.AUTO: POSITIVE
PH UR STRIP.AUTO: 5 [PH] (ref 4.5–8)
PROT UR QL STRIP.AUTO: 30 MG/DL
RBC #/AREA URNS HPF: (no result) RBC/HPF (ref 0–5)
SP GR UR STRIP.AUTO: 1.02
SQUAMOUS URNS QL MICRO: (no result) EPI/HPF
TRIGL SERPL-MCNC: 183 MG/DL (ref 30–149)
UROBILINOGEN UR QL STRIP.AUTO: 4 E.U./DL
VLDLC SERPL CALC-MCNC: 37 MG/DL
WBC #/AREA URNS HPF: (no result) WBC/HPF (ref 0–5)

## 2020-09-14 NOTE — NUR
NEW BAG CARDIZEM INITIATED, RATE DECREASED TO 20ML/HOUR. NIBP 117/70, AFIB
WITH A RATE BETWEEN 70'S AND 90'S.

## 2020-09-14 NOTE — NUR
RECEIVED REPORT. PT BEDRESTING. WATCHING TV. DENIES CHEST PAIN OR PALPATATIONS
AT THIS TIME. IS OFF CARDIZEM FOR SEVERAL HOURS. IS IN SINUS RHYTHM AT A RATE
OF 72. COLOR PINK. SKIN W/D. RESP EVEN AND NONLABORED. NO DISTRESS NOTED

## 2020-09-14 NOTE — NUR
EDUARDO FROM CM AT BEDSIDE TO DISCUSS PLAN OF CARE AND DISCHARGE PLANNING. CALL
LIGHT IN REACH. WILL MONITOR.

## 2020-09-14 NOTE — NUR
PT APPEARS TO BE RESTING COMFORTABLY, RESPIRATIONS REGULAR AND UNLABORED, NO
APPARENT DISTRESS OR DISCOMFORT NOTED. ON MONITOR PT REMAINS IN AFIB WITH A
RATE OF 105bpm. CALL PITT REMAINS WITHIN REACH.

## 2020-09-14 NOTE — NUR
PT. RESTING IN BE WITH NO DISTRESS NOTED; DENIES NEEDS. REMIANS SR ON THE
MONITOR. CALL LIGHT IS IN REACH.

## 2020-09-14 NOTE — NUR
PT ASSISTED WITH BEDPAN, MODERATE BM, BROWN SOFT. GOOD PERICARE PROVIDED. PT
TOLERATED WELL. CALL LIGHT IN REACH. WILL MONITOR.

## 2020-09-14 NOTE — NUR
PT C/O NAUSEA, NO EMESIS NOTED. MAKAYLA GLASS NOTIFIED. NEW ORDERS RECIEVED. PT
MEDICATED FOR N/V AS ORDERED PER PT REQUEST. CALL LIGHT IN REACH. WILL
MONITOR.

## 2020-09-14 NOTE — NUR
PT. SITTING UP IN BED WITH NO DISTRESS NOTED; DENIES PAIN AT THIS TIME.
CARDIAC MONITOR IN PLACE AND READING SR AT THIS TIME. B/P WNL. VSS. SCHED MEDS
GIVEN ALONG WITH PRN SONATA TO ASSIST WITH SLEEP PER PT'S REQUEST. PUREWIC IN
PLACE AND EMPTIED OF 700MLS OF ORANGE URINE. PT. ABLE TO REPOSITION SELF IN
BED. EMS SITE TO RAC AND PT. DECLINES WANTING IT CHANGED OUT; PATENT AND SHOWS
NO S/S OF INFILTRATION. ENCOURAGED TO CALL FOR ANY NEEDS. CALL LIGHT IS IN
REACH. WILL CONTINUE TO MONITOR.

## 2020-09-14 NOTE — NUR
PUREWICK TUBING DISCONNECTED, PT INCONTINENT OF URINE, PERICARE COMPLETE AND
LINENS CHANGED W/ ASSIST FROM BERKLEY MALDONADO RN. URINE NOTED TO BE BRIGHT
YELLOWISH-ORANGE. ASSUMED SECONDARY TO PYRIDIUM DOSE IN ED. NEW PUREWICK
SECURED IN PLACE. CARDIZEM GTT DECREASED TO 15ML/H. PT DENIES FURTHER NEEDS,
CALL BELL WITHIN REACH, AGREES TO CALL PRN.

## 2020-09-14 NOTE — NUR
PT A&OX4, ABLE TO MAKE NEEDS KNOWN. RESPIRATIONS EVEN/UNLABORED, SA02@ 93%RA.
LS CLEAR THROUGHOUT.  CARDIZEM AT 15MG/HR, TITRATED DOWN TO 10MG/HR. PT DENIES
CP, SOB OR DISTRESS AT THIS TIME. ABDOMEN SOFT, NON-TENDER LBM 9-12-20. PT INC
OF BLADDER, PUREWICK IN PLACE. RED URINE NOTED AT BSD VIA INTERMITTANT
SUCTION. CALL LIGHT IN REACH. WILL MONITOR.

## 2020-09-14 NOTE — NUR
PT RESTING IN BED, RESPIRATIONS EVEN/UNLABORED, AFIB ON TELEMETRY, HR 75. PT
DENIES CP, SOB OR DISTRESS AT THIS TIME. PUREWICK IN PLACE. CALL LIGHT IN
REACH. WILL MONITOR.

## 2020-09-14 NOTE — NUR
SINCE PT IS INCONTINENT, URINE SAMPLE FOR U/A COLLECTED FROM [employer]
COLLECTION CANISTER AND SENT TO LAB. URINE IS CLEAR AND BRIGHT ORANGE.

## 2020-09-15 VITALS — DIASTOLIC BLOOD PRESSURE: 69 MMHG | SYSTOLIC BLOOD PRESSURE: 119 MMHG

## 2020-09-15 VITALS — DIASTOLIC BLOOD PRESSURE: 67 MMHG | SYSTOLIC BLOOD PRESSURE: 127 MMHG

## 2020-09-15 VITALS — SYSTOLIC BLOOD PRESSURE: 138 MMHG | DIASTOLIC BLOOD PRESSURE: 68 MMHG

## 2020-09-15 VITALS — SYSTOLIC BLOOD PRESSURE: 130 MMHG | DIASTOLIC BLOOD PRESSURE: 67 MMHG

## 2020-09-15 VITALS — SYSTOLIC BLOOD PRESSURE: 119 MMHG | DIASTOLIC BLOOD PRESSURE: 62 MMHG

## 2020-09-15 VITALS — SYSTOLIC BLOOD PRESSURE: 137 MMHG | DIASTOLIC BLOOD PRESSURE: 71 MMHG

## 2020-09-15 VITALS — SYSTOLIC BLOOD PRESSURE: 126 MMHG | DIASTOLIC BLOOD PRESSURE: 61 MMHG

## 2020-09-15 NOTE — NUR
PT. PROVIDED WITH FRANCO CARE AND PURWIC CHANGED OUT. PT. ABLE TO REPOSITION
SELF IN BED. REMAINS SR ON CARDIAC MONITOR. PO FLUIDS OFFERED. ENCOURAGED TO
CALL FOR ANY NEEDS. CALL LIGHT IS IN REACH.

## 2020-09-15 NOTE — NUR
Discharge instructions given. Patient verbalizes understanding of same.
Discharged in stable condition, pt given medication scripts. Pt taken via
wheelchair to home accompanied by this writer. All belongings sent
with pt.

## 2020-09-15 NOTE — NUR
PT SITTING IN BED. A&O X3. NO DISTRESS NOTED. PT STATES PAIN IS DOING BETTER.
BLE EDEMA NOTED. PUREWICK IN PLACE WITH ORANGE URINE NOTED. NO OTHER NEEDS AT
THIS TIME. ASSESSMENT COMPLETED. DISCUSSED POC. CALL LIGHT IN REACH. CONTINUE
TO MONITOR.

## 2020-09-15 NOTE — NUR
PT. RESTING IN BED WITH EYES CLOSED; RESP. EVEN AND UNLABORED. NO DISTRESS
NOTED; CARDIAC MONITOR IN PLACE AND READING SR WITH HR IN THE 60'S. CALL LIGHT
IS IN REACH.

## 2020-09-16 NOTE — NUR
Final urine culture results show staphylococcus epidermidis >100,000 cfu/ml
with sensitivity to doxycycline and resistance to oxacillin. Pt contacted and
instructed to discontinue Keflex and start doxycycline per QUAN Lucio.
Educated pt on administration of new antibiotic and all other questions
answered. New rx for doxycycline 100 mg po bid x 7 days called to [facility].  Urine
culture and new treatment plan faxed to Dr Malcolm's office.

## 2020-12-04 ENCOUNTER — HOSPITAL ENCOUNTER (INPATIENT)
Dept: HOSPITAL 82 - ED | Age: 80
LOS: 2 days | Discharge: HOME | DRG: 309 | End: 2020-12-06
Attending: INTERNAL MEDICINE | Admitting: INTERNAL MEDICINE
Payer: MEDICARE

## 2020-12-04 VITALS — BODY MASS INDEX: 40 KG/M2 | WEIGHT: 198.42 LBS | HEIGHT: 59 IN

## 2020-12-04 DIAGNOSIS — I10: ICD-10-CM

## 2020-12-04 DIAGNOSIS — M19.90: ICD-10-CM

## 2020-12-04 DIAGNOSIS — G89.29: ICD-10-CM

## 2020-12-04 DIAGNOSIS — Z88.6: ICD-10-CM

## 2020-12-04 DIAGNOSIS — Z79.899: ICD-10-CM

## 2020-12-04 DIAGNOSIS — E11.9: ICD-10-CM

## 2020-12-04 DIAGNOSIS — L03.115: ICD-10-CM

## 2020-12-04 DIAGNOSIS — E87.6: ICD-10-CM

## 2020-12-04 DIAGNOSIS — L03.116: ICD-10-CM

## 2020-12-04 DIAGNOSIS — K21.9: ICD-10-CM

## 2020-12-04 DIAGNOSIS — N39.0: ICD-10-CM

## 2020-12-04 DIAGNOSIS — Z79.01: ICD-10-CM

## 2020-12-04 DIAGNOSIS — Z79.891: ICD-10-CM

## 2020-12-04 DIAGNOSIS — I25.10: ICD-10-CM

## 2020-12-04 DIAGNOSIS — I48.91: Primary | ICD-10-CM

## 2020-12-04 DIAGNOSIS — Z87.440: ICD-10-CM

## 2020-12-04 NOTE — NUR
PT TO ROOM 10 BY EMS FOR RAPID HEART RATE. GIVEN 25MG CARDIZEM WHICH TOOK HER
HR FROM 180-200 'S PER EMS.

## 2020-12-05 VITALS — DIASTOLIC BLOOD PRESSURE: 75 MMHG | SYSTOLIC BLOOD PRESSURE: 160 MMHG

## 2020-12-05 VITALS — SYSTOLIC BLOOD PRESSURE: 144 MMHG | DIASTOLIC BLOOD PRESSURE: 63 MMHG

## 2020-12-05 VITALS — SYSTOLIC BLOOD PRESSURE: 131 MMHG | DIASTOLIC BLOOD PRESSURE: 72 MMHG

## 2020-12-05 VITALS — DIASTOLIC BLOOD PRESSURE: 87 MMHG | SYSTOLIC BLOOD PRESSURE: 133 MMHG

## 2020-12-05 VITALS — SYSTOLIC BLOOD PRESSURE: 114 MMHG | DIASTOLIC BLOOD PRESSURE: 70 MMHG

## 2020-12-05 VITALS — DIASTOLIC BLOOD PRESSURE: 70 MMHG | SYSTOLIC BLOOD PRESSURE: 123 MMHG

## 2020-12-05 VITALS — SYSTOLIC BLOOD PRESSURE: 118 MMHG | DIASTOLIC BLOOD PRESSURE: 59 MMHG

## 2020-12-05 VITALS — SYSTOLIC BLOOD PRESSURE: 119 MMHG | DIASTOLIC BLOOD PRESSURE: 63 MMHG

## 2020-12-05 VITALS — SYSTOLIC BLOOD PRESSURE: 119 MMHG | DIASTOLIC BLOOD PRESSURE: 69 MMHG

## 2020-12-05 VITALS — DIASTOLIC BLOOD PRESSURE: 72 MMHG | SYSTOLIC BLOOD PRESSURE: 127 MMHG

## 2020-12-05 VITALS — DIASTOLIC BLOOD PRESSURE: 70 MMHG | SYSTOLIC BLOOD PRESSURE: 139 MMHG

## 2020-12-05 VITALS — SYSTOLIC BLOOD PRESSURE: 143 MMHG | DIASTOLIC BLOOD PRESSURE: 85 MMHG

## 2020-12-05 VITALS — SYSTOLIC BLOOD PRESSURE: 130 MMHG | DIASTOLIC BLOOD PRESSURE: 85 MMHG

## 2020-12-05 VITALS — SYSTOLIC BLOOD PRESSURE: 125 MMHG | DIASTOLIC BLOOD PRESSURE: 133 MMHG

## 2020-12-05 VITALS — DIASTOLIC BLOOD PRESSURE: 79 MMHG | SYSTOLIC BLOOD PRESSURE: 189 MMHG

## 2020-12-05 VITALS — SYSTOLIC BLOOD PRESSURE: 140 MMHG | DIASTOLIC BLOOD PRESSURE: 69 MMHG

## 2020-12-05 VITALS — SYSTOLIC BLOOD PRESSURE: 143 MMHG | DIASTOLIC BLOOD PRESSURE: 74 MMHG

## 2020-12-05 VITALS — DIASTOLIC BLOOD PRESSURE: 82 MMHG | SYSTOLIC BLOOD PRESSURE: 132 MMHG

## 2020-12-05 VITALS — SYSTOLIC BLOOD PRESSURE: 123 MMHG | DIASTOLIC BLOOD PRESSURE: 65 MMHG

## 2020-12-05 LAB
ALBUMIN SERPL-MCNC: 4.4 G/DL (ref 3.2–5)
ALP SERPL-CCNC: 104 U/L (ref 38–126)
ANION GAP SERPL CALCULATED.3IONS-SCNC: 16 MMOL/L
APTT PPP: 29.6 SECONDS (ref 20–32.5)
AST SERPL-CCNC: 28 U/L (ref 9–36)
BASOPHILS NFR BLD AUTO: 1 % (ref 0–3)
BILIRUB UR QL STRIP.AUTO: NEGATIVE
BUN SERPL-MCNC: 14 MG/DL (ref 8–23)
BUN/CREAT SERPL: 20
CHLORIDE SERPL-SCNC: 106 MMOL/L (ref 95–108)
CO2 SERPL-SCNC: 24 MMOL/L (ref 22–30)
COLOR UR AUTO: YELLOW
CREAT SERPL-MCNC: 0.7 MG/DL (ref 0.5–1)
EOSINOPHIL NFR BLD AUTO: 1 % (ref 0–8)
ERYTHROCYTE [DISTWIDTH] IN BLOOD BY AUTOMATED COUNT: 13.9 % (ref 11.5–15.5)
GLUCOSE UR STRIP.AUTO-MCNC: NEGATIVE MG/DL
HCT VFR BLD AUTO: 41.5 % (ref 37–47)
HGB BLD-MCNC: 13.2 G/DL (ref 12–16)
HGB UR QL STRIP.AUTO: (no result)
IMM GRANULOCYTES NFR BLD: 0.1 % (ref 0–5)
INR PPP: 1 RATIO (ref 0.7–1.3)
KETONES UR STRIP.AUTO-MCNC: NEGATIVE MG/DL
LEUKOCYTE ESTERASE UR QL STRIP.AUTO: NEGATIVE
LYMPHOCYTES NFR BLD: 25 % (ref 15–41)
MCH RBC QN AUTO: 26.7 PG  CALC (ref 26–32)
MCHC RBC AUTO-ENTMCNC: 31.8 G/DL CAL (ref 32–36)
MCV RBC AUTO: 83.8 FL  CALC (ref 80–100)
MONOCYTES NFR BLD AUTO: 4 % (ref 2–13)
MYOGLOBIN SERPL-MCNC: 28 NG/ML (ref 0–62)
NEUTROPHILS # BLD AUTO: 5.36 THOU/UL (ref 2–7.15)
NEUTROPHILS NFR BLD AUTO: 69 % (ref 42–76)
NITRITE UR QL STRIP.AUTO: NEGATIVE
PH UR STRIP.AUTO: 6 [PH] (ref 4.5–8)
PLATELET # BLD AUTO: 342 THOU/UL (ref 130–400)
POTASSIUM SERPL-SCNC: 3.8 MMOL/L (ref 3.5–5.1)
PROT SERPL-MCNC: 7.6 G/DL (ref 6.3–8.2)
PROT UR QL STRIP.AUTO: NEGATIVE MG/DL
PROTHROMBIN TIME: 9.7 SECONDS (ref 9–12.5)
RBC # BLD AUTO: 4.95 MILL/UL (ref 4.2–5.6)
SODIUM SERPL-SCNC: 142 MMOL/L (ref 137–146)
SP GR UR STRIP.AUTO: 1.01
UROBILINOGEN UR QL STRIP.AUTO: 0.2 E.U./DL

## 2020-12-05 NOTE — NUR
PT RESTING QUIETLY ON BED WATCHING HALLMARK MOVIES,  STATES DR. VILLANUEVA STATES
SHE CAN PROBABLY GO HOME TOMORROW, IF HER VITAL SIGNS STABILIZE WITH PO
CARDIZEM.  A/O X3.

## 2020-12-05 NOTE — NUR
RESTING WITH EYES CLOSED. RESP NON-LABORED AT REST. BREATH SOUNDS CLEAR
THROUGHOUT. ABD SOFTLY DISTENDED WITH BOWEL SOUNDS PRESENT. PUREWICK IN PLACE
DRAINING CLEAR PALE YELLOW URINE. NON-PITTING EDEMA OF BILATERAL LOWER LEGS.
1+ PITTING PEDAL EDEMA BILATERALLY. LOWER LEGS REDDENED AND TENDER TO TOUCH.
PERIPHERAL PULSES PALPABLE. IV IN RFA WITH NS AT 10 ML/HR AND NS AT 10 MG/HR.
IV SITE BENIGN. CARDIAC MONITOR CONTINUES IN AFIBWITH VARIABLE HR. DISCUSSED
PLAN OF CARE. DENIES NEEDS AT THIS TIME. CALL BELL IN REACH.

## 2020-12-05 NOTE — NUR
CARDIZEM DRIP INITIATED.  PT HR GOES FROM 140-160 AFIB RVR.  SWABS COLLECTED
FOR ADMIT. PT INSISTED SHE HAS BEEN QUARANTINED FOR 6 MONTHS AT HOME BUT PT
GOES OUT TO A DRIVE THRU TO GET FOOD AND HAS CAREGIVER WHO COMES INTO HER HOME
3 TIMES A WEEK. PT DENIES ANY COVID SIGNS.

## 2020-12-05 NOTE — NUR
Admission Note
 
Report Given to:         MARY ELLEN WISDOM
Transported by:           Wheelchair          X Stretcher
 
Transported with:        X Nurse     Transporter   X Patent IV    O2
                         X Cardiac Monitor
 
Location:                X ICU       MS2
 
 
          
 
         CARDIZEM DRIP INFUSING AT 10

## 2020-12-05 NOTE — NUR
REPORT RECEIVED FROM NIGHT SHIFT.  PT ALERT/ORIENTED X3, DENIES ANY CHEST PAIN
OR SOB,  STATES JUST HAS A LITTLE BIT OF BACK PAIN, AND WOULD LIKE TO TALK TO
DOCTOR ABOUT SOMETHING STRONGER THAN TYLENOL

## 2020-12-05 NOTE — NUR
RECEIVED INTO ICU BED 2 FROM ER VIA STRETCHER. PATIENT ABLE TO TRANSFER FROM
STRETCHER TO BED WITH ASSIST OF STAFF. ALERT AND ORIENTED X3. RESP SLT LABORED
WITH EXERTION. BREATH SOUNDS CLEAR THROUGHOUT. ABD SOFTLY DISTENDED, WITH
BOWEL SOUNDS PRESENT. PUREWICK IN PLACE, DRAINING CLEAR YELLOW URINE.
NON-PITTING EDEMA OF BILATERAL LOWER LEGS. 1+ PEDAL EDEMA NOTED BILATERALLY.
MILD REDNESS NOTED TO BILATERAL LOWER LEGS. IV IN RFA, SITE BENIGN, NS
INFUSING AT 10 ML/HR AND CARDIZEM GTT INFUSING AT 10 MG/HR. CARDIAC MONITOR
SHOWS AFIB, VARIABLE RATE. DISCUSED PLAN OF CARE. EXPLAINED USE OF CALL BELL
AND BED CONTROLS.

## 2020-12-05 NOTE — NUR
RESTING WITH EYES CLOSED. RESP NON-LABORED. VSS. -110 CONTINUES IN AFIB.
CARDIZEM GTT DECREASED TO 5 MG/HR.

## 2020-12-06 VITALS — DIASTOLIC BLOOD PRESSURE: 84 MMHG | SYSTOLIC BLOOD PRESSURE: 149 MMHG

## 2020-12-06 VITALS — DIASTOLIC BLOOD PRESSURE: 72 MMHG | SYSTOLIC BLOOD PRESSURE: 154 MMHG

## 2020-12-06 VITALS — SYSTOLIC BLOOD PRESSURE: 132 MMHG | DIASTOLIC BLOOD PRESSURE: 76 MMHG

## 2020-12-06 VITALS — DIASTOLIC BLOOD PRESSURE: 60 MMHG | SYSTOLIC BLOOD PRESSURE: 12 MMHG

## 2020-12-06 VITALS — DIASTOLIC BLOOD PRESSURE: 66 MMHG | SYSTOLIC BLOOD PRESSURE: 151 MMHG

## 2020-12-06 VITALS — DIASTOLIC BLOOD PRESSURE: 73 MMHG | SYSTOLIC BLOOD PRESSURE: 138 MMHG

## 2020-12-06 VITALS — SYSTOLIC BLOOD PRESSURE: 146 MMHG | DIASTOLIC BLOOD PRESSURE: 62 MMHG

## 2020-12-06 VITALS — SYSTOLIC BLOOD PRESSURE: 151 MMHG | DIASTOLIC BLOOD PRESSURE: 66 MMHG

## 2020-12-06 VITALS — SYSTOLIC BLOOD PRESSURE: 143 MMHG | DIASTOLIC BLOOD PRESSURE: 70 MMHG

## 2020-12-06 VITALS — SYSTOLIC BLOOD PRESSURE: 150 MMHG | DIASTOLIC BLOOD PRESSURE: 68 MMHG

## 2020-12-06 VITALS — SYSTOLIC BLOOD PRESSURE: 143 MMHG | DIASTOLIC BLOOD PRESSURE: 64 MMHG

## 2020-12-06 VITALS — SYSTOLIC BLOOD PRESSURE: 133 MMHG | DIASTOLIC BLOOD PRESSURE: 64 MMHG

## 2020-12-06 VITALS — DIASTOLIC BLOOD PRESSURE: 64 MMHG | SYSTOLIC BLOOD PRESSURE: 133 MMHG

## 2020-12-06 VITALS — SYSTOLIC BLOOD PRESSURE: 138 MMHG | DIASTOLIC BLOOD PRESSURE: 70 MMHG

## 2020-12-06 VITALS — DIASTOLIC BLOOD PRESSURE: 66 MMHG | SYSTOLIC BLOOD PRESSURE: 142 MMHG

## 2020-12-06 LAB
ANION GAP SERPL CALCULATED.3IONS-SCNC: 8 MMOL/L
BASOPHILS NFR BLD AUTO: 1 % (ref 0–3)
BUN SERPL-MCNC: 11 MG/DL (ref 8–23)
BUN/CREAT SERPL: 18
CHLORIDE SERPL-SCNC: 108 MMOL/L (ref 95–108)
CO2 SERPL-SCNC: 26 MMOL/L (ref 22–30)
CREAT SERPL-MCNC: 0.6 MG/DL (ref 0.5–1)
EOSINOPHIL NFR BLD AUTO: 2 % (ref 0–8)
ERYTHROCYTE [DISTWIDTH] IN BLOOD BY AUTOMATED COUNT: 14.1 % (ref 11.5–15.5)
HCT VFR BLD AUTO: 37.8 % (ref 37–47)
HGB BLD-MCNC: 11.9 G/DL (ref 12–16)
IMM GRANULOCYTES NFR BLD: 0.1 % (ref 0–5)
LYMPHOCYTES NFR BLD: 44 % (ref 15–41)
MAGNESIUM SERPL-MCNC: 1.9 MG/DL (ref 1.6–2.3)
MCH RBC QN AUTO: 26.7 PG  CALC (ref 26–32)
MCHC RBC AUTO-ENTMCNC: 31.5 G/DL CAL (ref 32–36)
MCV RBC AUTO: 84.8 FL  CALC (ref 80–100)
MONOCYTES NFR BLD AUTO: 6 % (ref 2–13)
NEUTROPHILS # BLD AUTO: 3.18 THOU/UL (ref 2–7.15)
NEUTROPHILS NFR BLD AUTO: 47 % (ref 42–76)
PLATELET # BLD AUTO: 330 THOU/UL (ref 130–400)
POTASSIUM SERPL-SCNC: 3.7 MMOL/L (ref 3.5–5.1)
RBC # BLD AUTO: 4.46 MILL/UL (ref 4.2–5.6)
SODIUM SERPL-SCNC: 138 MMOL/L (ref 137–146)

## 2020-12-06 NOTE — NUR
PTS LOWER LEGS REMAIN SWOLLEN, SLIGHTLY RED, PT STATES HAS ARTHRITIS AND SHE
HAS TRIED LASIX IN PAST BUT SHE DOESNT WANT TO PEE ALL THE TIME AND SHE STATES
SHE WAS GETTING UP TO URINATE EVERY 30 MIN AND GETTING NO REST, SO SHE STATES
SHE WILL NOT USE THEM EVER AGAIN.

## 2020-12-06 NOTE — NUR
PT RESTING QUIETLY ON BED WATCHING TV,  ADVISED WE ARE WAITING ON FINAL
DISCHARGE INST FROM DOCTOR AND THEN START GETTING HER READY FOR DISCHARGE

## 2020-12-06 NOTE — NUR
TRAMADOL 50 MG PO GIVEN FOR C/O RIGHT KNEE PAIN. VSS. AFIB VARIABLE
HR-70'S-90'S. WEANING CARDIZEM GTT-INFUISNG AT 2.5 MG/HR.

## 2020-12-08 ENCOUNTER — HOSPITAL ENCOUNTER (INPATIENT)
Dept: HOSPITAL 82 - ED | Age: 80
LOS: 2 days | Discharge: PSYCHIATRIC OR SUBSTANCE USE FACILITY | DRG: 309 | End: 2020-12-10
Attending: INTERNAL MEDICINE | Admitting: INTERNAL MEDICINE
Payer: MEDICARE

## 2020-12-08 VITALS — DIASTOLIC BLOOD PRESSURE: 64 MMHG | SYSTOLIC BLOOD PRESSURE: 136 MMHG

## 2020-12-08 VITALS — DIASTOLIC BLOOD PRESSURE: 88 MMHG | SYSTOLIC BLOOD PRESSURE: 111 MMHG

## 2020-12-08 VITALS — SYSTOLIC BLOOD PRESSURE: 116 MMHG | DIASTOLIC BLOOD PRESSURE: 67 MMHG

## 2020-12-08 VITALS — DIASTOLIC BLOOD PRESSURE: 57 MMHG | SYSTOLIC BLOOD PRESSURE: 119 MMHG

## 2020-12-08 VITALS — SYSTOLIC BLOOD PRESSURE: 136 MMHG | DIASTOLIC BLOOD PRESSURE: 64 MMHG

## 2020-12-08 VITALS — DIASTOLIC BLOOD PRESSURE: 60 MMHG | SYSTOLIC BLOOD PRESSURE: 138 MMHG

## 2020-12-08 VITALS — DIASTOLIC BLOOD PRESSURE: 62 MMHG | SYSTOLIC BLOOD PRESSURE: 141 MMHG

## 2020-12-08 VITALS — DIASTOLIC BLOOD PRESSURE: 56 MMHG | SYSTOLIC BLOOD PRESSURE: 100 MMHG

## 2020-12-08 VITALS — WEIGHT: 187.39 LBS | BODY MASS INDEX: 37.78 KG/M2 | HEIGHT: 59 IN

## 2020-12-08 VITALS — SYSTOLIC BLOOD PRESSURE: 100 MMHG | DIASTOLIC BLOOD PRESSURE: 56 MMHG

## 2020-12-08 VITALS — SYSTOLIC BLOOD PRESSURE: 98 MMHG | DIASTOLIC BLOOD PRESSURE: 64 MMHG

## 2020-12-08 DIAGNOSIS — I10: ICD-10-CM

## 2020-12-08 DIAGNOSIS — Z79.01: ICD-10-CM

## 2020-12-08 DIAGNOSIS — K21.9: ICD-10-CM

## 2020-12-08 DIAGNOSIS — Z79.899: ICD-10-CM

## 2020-12-08 DIAGNOSIS — Z87.440: ICD-10-CM

## 2020-12-08 DIAGNOSIS — Z88.6: ICD-10-CM

## 2020-12-08 DIAGNOSIS — I48.20: Primary | ICD-10-CM

## 2020-12-08 DIAGNOSIS — R26.9: ICD-10-CM

## 2020-12-08 DIAGNOSIS — E66.9: ICD-10-CM

## 2020-12-08 DIAGNOSIS — Z88.8: ICD-10-CM

## 2020-12-08 DIAGNOSIS — Z20.828: ICD-10-CM

## 2020-12-08 DIAGNOSIS — M19.90: ICD-10-CM

## 2020-12-08 DIAGNOSIS — E11.9: ICD-10-CM

## 2020-12-08 LAB
ALBUMIN SERPL-MCNC: 4.1 G/DL (ref 3.2–5)
ALP SERPL-CCNC: 90 U/L (ref 38–126)
ANION GAP SERPL CALCULATED.3IONS-SCNC: 13 MMOL/L
APTT PPP: 28.8 SECONDS (ref 20–32.5)
AST SERPL-CCNC: 30 U/L (ref 9–36)
BASOPHILS NFR BLD AUTO: 0 % (ref 0–3)
BILIRUB UR QL STRIP.AUTO: NEGATIVE
BUN SERPL-MCNC: 11 MG/DL (ref 8–23)
BUN/CREAT SERPL: 16
CHLORIDE SERPL-SCNC: 107 MMOL/L (ref 95–108)
CO2 SERPL-SCNC: 27 MMOL/L (ref 22–30)
COLOR UR AUTO: YELLOW
CREAT SERPL-MCNC: 0.7 MG/DL (ref 0.5–1)
EOSINOPHIL NFR BLD AUTO: 1 % (ref 0–8)
ERYTHROCYTE [DISTWIDTH] IN BLOOD BY AUTOMATED COUNT: 14.2 % (ref 11.5–15.5)
GLUCOSE UR STRIP.AUTO-MCNC: NEGATIVE MG/DL
HCT VFR BLD AUTO: 41.6 % (ref 37–47)
HGB BLD-MCNC: 13 G/DL (ref 12–16)
HGB UR QL STRIP.AUTO: NEGATIVE
IMM GRANULOCYTES NFR BLD: 0.6 % (ref 0–5)
INR PPP: 1 RATIO (ref 0.7–1.3)
KETONES UR STRIP.AUTO-MCNC: NEGATIVE MG/DL
LEUKOCYTE ESTERASE UR QL STRIP.AUTO: NEGATIVE
LYMPHOCYTES NFR BLD: 34 % (ref 15–41)
MCH RBC QN AUTO: 26.5 PG  CALC (ref 26–32)
MCHC RBC AUTO-ENTMCNC: 31.3 G/DL CAL (ref 32–36)
MCV RBC AUTO: 84.9 FL  CALC (ref 80–100)
MONOCYTES NFR BLD AUTO: 7 % (ref 2–13)
NEUTROPHILS # BLD AUTO: 4.16 THOU/UL (ref 2–7.15)
NEUTROPHILS NFR BLD AUTO: 57 % (ref 42–76)
NITRITE UR QL STRIP.AUTO: NEGATIVE
PH UR STRIP.AUTO: 7 [PH] (ref 4.5–8)
PLATELET # BLD AUTO: 370 THOU/UL (ref 130–400)
POTASSIUM SERPL-SCNC: 3.6 MMOL/L (ref 3.5–5.1)
PROT SERPL-MCNC: 7.4 G/DL (ref 6.3–8.2)
PROT UR QL STRIP.AUTO: NEGATIVE MG/DL
PROTHROMBIN TIME: 10.4 SECONDS (ref 9–12.5)
RBC # BLD AUTO: 4.9 MILL/UL (ref 4.2–5.6)
SODIUM SERPL-SCNC: 143 MMOL/L (ref 137–146)
SP GR UR STRIP.AUTO: 1.01
UROBILINOGEN UR QL STRIP.AUTO: 0.2 E.U./DL

## 2020-12-09 VITALS — SYSTOLIC BLOOD PRESSURE: 119 MMHG | DIASTOLIC BLOOD PRESSURE: 58 MMHG

## 2020-12-09 VITALS — SYSTOLIC BLOOD PRESSURE: 123 MMHG | DIASTOLIC BLOOD PRESSURE: 62 MMHG

## 2020-12-09 VITALS — DIASTOLIC BLOOD PRESSURE: 76 MMHG | SYSTOLIC BLOOD PRESSURE: 137 MMHG

## 2020-12-09 VITALS — DIASTOLIC BLOOD PRESSURE: 56 MMHG | SYSTOLIC BLOOD PRESSURE: 103 MMHG

## 2020-12-09 VITALS — DIASTOLIC BLOOD PRESSURE: 75 MMHG | SYSTOLIC BLOOD PRESSURE: 125 MMHG

## 2020-12-09 VITALS — DIASTOLIC BLOOD PRESSURE: 71 MMHG | SYSTOLIC BLOOD PRESSURE: 111 MMHG

## 2020-12-09 VITALS — SYSTOLIC BLOOD PRESSURE: 142 MMHG | DIASTOLIC BLOOD PRESSURE: 72 MMHG

## 2020-12-09 VITALS — SYSTOLIC BLOOD PRESSURE: 109 MMHG | DIASTOLIC BLOOD PRESSURE: 57 MMHG

## 2020-12-09 VITALS — DIASTOLIC BLOOD PRESSURE: 69 MMHG | SYSTOLIC BLOOD PRESSURE: 122 MMHG

## 2020-12-09 VITALS — DIASTOLIC BLOOD PRESSURE: 69 MMHG | SYSTOLIC BLOOD PRESSURE: 143 MMHG

## 2020-12-09 VITALS — SYSTOLIC BLOOD PRESSURE: 112 MMHG | DIASTOLIC BLOOD PRESSURE: 57 MMHG

## 2020-12-09 VITALS — SYSTOLIC BLOOD PRESSURE: 102 MMHG | DIASTOLIC BLOOD PRESSURE: 65 MMHG

## 2020-12-09 VITALS — SYSTOLIC BLOOD PRESSURE: 113 MMHG | DIASTOLIC BLOOD PRESSURE: 65 MMHG

## 2020-12-09 VITALS — DIASTOLIC BLOOD PRESSURE: 64 MMHG | SYSTOLIC BLOOD PRESSURE: 118 MMHG

## 2020-12-09 VITALS — DIASTOLIC BLOOD PRESSURE: 71 MMHG | SYSTOLIC BLOOD PRESSURE: 135 MMHG

## 2020-12-09 VITALS — SYSTOLIC BLOOD PRESSURE: 114 MMHG | DIASTOLIC BLOOD PRESSURE: 87 MMHG

## 2020-12-09 VITALS — SYSTOLIC BLOOD PRESSURE: 121 MMHG | DIASTOLIC BLOOD PRESSURE: 67 MMHG

## 2020-12-09 VITALS — DIASTOLIC BLOOD PRESSURE: 61 MMHG | SYSTOLIC BLOOD PRESSURE: 116 MMHG

## 2020-12-09 LAB
ALBUMIN SERPL-MCNC: 3.5 G/DL (ref 3.2–5)
ALP SERPL-CCNC: 79 U/L (ref 38–126)
ANION GAP SERPL CALCULATED.3IONS-SCNC: 9 MMOL/L
AST SERPL-CCNC: 19 U/L (ref 9–36)
BASOPHILS NFR BLD AUTO: 1 % (ref 0–3)
BUN SERPL-MCNC: 10 MG/DL (ref 8–23)
BUN/CREAT SERPL: 15
CHLORIDE SERPL-SCNC: 107 MMOL/L (ref 95–108)
CHOLEST SERPL-MCNC: 208 MG/DL (ref 0–199)
CHOLEST/HDLC SERPL: 5.1 {RATIO}
CO2 SERPL-SCNC: 25 MMOL/L (ref 22–30)
CREAT SERPL-MCNC: 0.7 MG/DL (ref 0.5–1)
EOSINOPHIL NFR BLD AUTO: 3 % (ref 0–8)
ERYTHROCYTE [DISTWIDTH] IN BLOOD BY AUTOMATED COUNT: 14.3 % (ref 11.5–15.5)
HCT VFR BLD AUTO: 36.9 % (ref 37–47)
HDLC SERPL-MCNC: 41 MG/DL (ref 40–?)
HGB BLD-MCNC: 11.8 G/DL (ref 12–16)
IMM GRANULOCYTES NFR BLD: 0.3 % (ref 0–5)
LDLC SERPL CALC-MCNC: 137 MG/DL
LYMPHOCYTES NFR BLD: 39 % (ref 15–41)
MAGNESIUM SERPL-MCNC: 2 MG/DL (ref 1.6–2.3)
MCH RBC QN AUTO: 27.1 PG  CALC (ref 26–32)
MCHC RBC AUTO-ENTMCNC: 32 G/DL CAL (ref 32–36)
MCV RBC AUTO: 84.6 FL  CALC (ref 80–100)
MONOCYTES NFR BLD AUTO: 7 % (ref 2–13)
NEUTROPHILS # BLD AUTO: 3.23 THOU/UL (ref 2–7.15)
NEUTROPHILS NFR BLD AUTO: 50 % (ref 42–76)
PLATELET # BLD AUTO: 358 THOU/UL (ref 130–400)
POTASSIUM SERPL-SCNC: 3.7 MMOL/L (ref 3.5–5.1)
PROT SERPL-MCNC: 6.1 G/DL (ref 6.3–8.2)
RBC # BLD AUTO: 4.36 MILL/UL (ref 4.2–5.6)
SODIUM SERPL-SCNC: 138 MMOL/L (ref 137–146)
TRIGL SERPL-MCNC: 153 MG/DL (ref 30–149)
VLDLC SERPL CALC-MCNC: 31 MG/DL

## 2020-12-10 VITALS — DIASTOLIC BLOOD PRESSURE: 72 MMHG | SYSTOLIC BLOOD PRESSURE: 138 MMHG

## 2020-12-10 VITALS — SYSTOLIC BLOOD PRESSURE: 133 MMHG | DIASTOLIC BLOOD PRESSURE: 68 MMHG

## 2020-12-10 VITALS — SYSTOLIC BLOOD PRESSURE: 135 MMHG | DIASTOLIC BLOOD PRESSURE: 68 MMHG

## 2020-12-10 VITALS — SYSTOLIC BLOOD PRESSURE: 136 MMHG | DIASTOLIC BLOOD PRESSURE: 74 MMHG

## 2020-12-10 VITALS — DIASTOLIC BLOOD PRESSURE: 68 MMHG | SYSTOLIC BLOOD PRESSURE: 119 MMHG

## 2020-12-10 VITALS — SYSTOLIC BLOOD PRESSURE: 123 MMHG | DIASTOLIC BLOOD PRESSURE: 64 MMHG

## 2020-12-10 VITALS — SYSTOLIC BLOOD PRESSURE: 127 MMHG | DIASTOLIC BLOOD PRESSURE: 73 MMHG

## 2020-12-10 VITALS — DIASTOLIC BLOOD PRESSURE: 63 MMHG | SYSTOLIC BLOOD PRESSURE: 124 MMHG

## 2020-12-12 ENCOUNTER — HOSPITAL ENCOUNTER (INPATIENT)
Dept: HOSPITAL 82 - ED | Age: 80
LOS: 5 days | Discharge: ANOTHER HOSPITAL | DRG: 310 | End: 2020-12-17
Attending: INTERNAL MEDICINE | Admitting: INTERNAL MEDICINE
Payer: MEDICARE

## 2020-12-12 VITALS — DIASTOLIC BLOOD PRESSURE: 52 MMHG | SYSTOLIC BLOOD PRESSURE: 95 MMHG

## 2020-12-12 VITALS — HEIGHT: 59 IN | BODY MASS INDEX: 41.81 KG/M2 | WEIGHT: 207.38 LBS

## 2020-12-12 VITALS — DIASTOLIC BLOOD PRESSURE: 74 MMHG | SYSTOLIC BLOOD PRESSURE: 138 MMHG

## 2020-12-12 VITALS — DIASTOLIC BLOOD PRESSURE: 75 MMHG | SYSTOLIC BLOOD PRESSURE: 111 MMHG

## 2020-12-12 VITALS — DIASTOLIC BLOOD PRESSURE: 74 MMHG | SYSTOLIC BLOOD PRESSURE: 92 MMHG

## 2020-12-12 VITALS — SYSTOLIC BLOOD PRESSURE: 120 MMHG | DIASTOLIC BLOOD PRESSURE: 62 MMHG

## 2020-12-12 VITALS — DIASTOLIC BLOOD PRESSURE: 74 MMHG | SYSTOLIC BLOOD PRESSURE: 122 MMHG

## 2020-12-12 VITALS — DIASTOLIC BLOOD PRESSURE: 87 MMHG | SYSTOLIC BLOOD PRESSURE: 133 MMHG

## 2020-12-12 VITALS — DIASTOLIC BLOOD PRESSURE: 74 MMHG | SYSTOLIC BLOOD PRESSURE: 96 MMHG

## 2020-12-12 VITALS — SYSTOLIC BLOOD PRESSURE: 111 MMHG | DIASTOLIC BLOOD PRESSURE: 68 MMHG

## 2020-12-12 VITALS — DIASTOLIC BLOOD PRESSURE: 58 MMHG | SYSTOLIC BLOOD PRESSURE: 129 MMHG

## 2020-12-12 DIAGNOSIS — K59.00: ICD-10-CM

## 2020-12-12 DIAGNOSIS — I10: ICD-10-CM

## 2020-12-12 DIAGNOSIS — I48.0: Primary | ICD-10-CM

## 2020-12-12 DIAGNOSIS — M19.90: ICD-10-CM

## 2020-12-12 DIAGNOSIS — Z87.11: ICD-10-CM

## 2020-12-12 DIAGNOSIS — I48.92: ICD-10-CM

## 2020-12-12 DIAGNOSIS — Z20.828: ICD-10-CM

## 2020-12-12 DIAGNOSIS — Z87.440: ICD-10-CM

## 2020-12-12 DIAGNOSIS — K21.9: ICD-10-CM

## 2020-12-12 DIAGNOSIS — Z79.01: ICD-10-CM

## 2020-12-12 LAB
ALBUMIN SERPL-MCNC: 3.9 G/DL (ref 3.2–5)
ALP SERPL-CCNC: 87 U/L (ref 38–126)
ANION GAP SERPL CALCULATED.3IONS-SCNC: 11 MMOL/L
AST SERPL-CCNC: 19 U/L (ref 9–36)
BASOPHILS NFR BLD AUTO: 0 % (ref 0–3)
BILIRUB UR QL STRIP.AUTO: NEGATIVE
BUN SERPL-MCNC: 21 MG/DL (ref 8–23)
BUN/CREAT SERPL: 28
CHLORIDE SERPL-SCNC: 106 MMOL/L (ref 95–108)
CO2 SERPL-SCNC: 27 MMOL/L (ref 22–30)
COLOR UR AUTO: YELLOW
CREAT SERPL-MCNC: 0.7 MG/DL (ref 0.5–1)
EOSINOPHIL NFR BLD AUTO: 1 % (ref 0–8)
ERYTHROCYTE [DISTWIDTH] IN BLOOD BY AUTOMATED COUNT: 14 % (ref 11.5–15.5)
GLUCOSE UR STRIP.AUTO-MCNC: NEGATIVE MG/DL
HCT VFR BLD AUTO: 39.2 % (ref 37–47)
HGB BLD-MCNC: 12.4 G/DL (ref 12–16)
HGB UR QL STRIP.AUTO: NEGATIVE
IMM GRANULOCYTES NFR BLD: 0.1 % (ref 0–5)
KETONES UR STRIP.AUTO-MCNC: NEGATIVE MG/DL
LEUKOCYTE ESTERASE UR QL STRIP.AUTO: NEGATIVE
LYMPHOCYTES NFR BLD: 27 % (ref 15–41)
MCH RBC QN AUTO: 26.7 PG  CALC (ref 26–32)
MCHC RBC AUTO-ENTMCNC: 31.6 G/DL CAL (ref 32–36)
MCV RBC AUTO: 84.3 FL  CALC (ref 80–100)
MONOCYTES NFR BLD AUTO: 6 % (ref 2–13)
MYOGLOBIN SERPL-MCNC: 25 NG/ML (ref 0–62)
NEUTROPHILS # BLD AUTO: 4.49 THOU/UL (ref 2–7.15)
NEUTROPHILS NFR BLD AUTO: 66 % (ref 42–76)
NITRITE UR QL STRIP.AUTO: NEGATIVE
PH UR STRIP.AUTO: 6 [PH] (ref 4.5–8)
PLATELET # BLD AUTO: 391 THOU/UL (ref 130–400)
POTASSIUM SERPL-SCNC: 3.9 MMOL/L (ref 3.5–5.1)
PROT SERPL-MCNC: 7 G/DL (ref 6.3–8.2)
PROT UR QL STRIP.AUTO: NEGATIVE MG/DL
RBC # BLD AUTO: 4.65 MILL/UL (ref 4.2–5.6)
SODIUM SERPL-SCNC: 140 MMOL/L (ref 137–146)
SP GR UR STRIP.AUTO: 1.01
UROBILINOGEN UR QL STRIP.AUTO: 0.2 E.U./DL

## 2020-12-12 NOTE — NUR
PT CONTINUES WITH AMIODARONE DRIP, NO CHANGE IN RHYTHM NOTED.  AFIB SEEN ON
MONITORS WITH OCCASIONAL PAUSES THAT LOOK REGULAR.  PT DENIES CHEST PAIN OR
SHORTNESS OF BREATH.

## 2020-12-12 NOTE — NUR
ORDERS FOR TRANSFER TO ICU. CALLED TO OBTAIN BED. WRITER NOTFIED THAT
HOUSEKEEPING WAS CLEANING ROOM AND WOULD NOTFIE WRITER WHEN AVAILABLE.

## 2020-12-12 NOTE — NUR
PT GIVEN WATER.  STILL NO URINE.  DAUGHTER CALLED FOR UPDATE. Interventional Cardiology PA Adult Progress Note    Subjective Assessment: Pt seen this morning when rapid response called around 8:45ish. Pt was found to be unresponsive. Upon arrival to the ER, pt was more responsive. Neuro exam intact, /100, HR 90s, Temp 97.9F. Lab work sent STAT. UTox STAT. Dr. Marc arrived at bedside and agreed w/ plan. CT head ordered STAT- neg. . Pt become fully responsive, A+Ox3 after 10 mins. Pt stated she is really tired and didn't get enough sleep.   	  MEDICATIONS:  amLODIPine   Tablet 5milliGRAM(s) Oral daily  furosemide    Tablet 40milliGRAM(s) Oral daily  metolazone 2.5milliGRAM(s) Oral daily  metoprolol 25milliGRAM(s) Oral two times a day        ondansetron Injectable 4milliGRAM(s) IV Push once PRN    pantoprazole    Tablet 40milliGRAM(s) Oral before breakfast  famotidine    Tablet 20milliGRAM(s) Oral daily    atorvastatin 10milliGRAM(s) Oral at bedtime    sodium chloride 0.9%. 1000milliLiter(s) IV Continuous <Continuous>  aspirin  chewable 81milliGRAM(s) Oral daily      	    [PHYSICAL EXAM:  TELEMETRY:  T(C): 36.9, Max: 37.1 (01-11 @ 20:15)  HR: 105 (81 - 105)  BP: 145/89 (109/71 - 151/90)  RR: 18 (16 - 18)  SpO2: 100% (93% - 100%)  Wt(kg): --  I&O's Summary      [place]:  Central/PICC/Mid Line:                                         Appearance: Normal	  Neck: Supple, no JVD   Cardiovascular: RRR, S1 S2, no murmurs   Respiratory: Lungs clear to auscultation, decrease BS at bases 	  Gastrointestinal:  +BS soft, obese, NT/ND   Extremities: Normal range of motion, No edema b/l   Vascular: Peripheral pulses palpable 2+ bilaterally  Neurologic: Non-focal  Psychiatry: A & O x 3, Mood & affect appropriate      	    	    LABS:	 	  CARDIAC MARKERS:  Troponin I, Serum: 0.064 ng/mL (01-12 @ 09:28)  Troponin I, Serum: 0.067 ng/mL (01-12 @ 06:15)          Troponin I, Serum: 0.064 ng/mL (01-12 @ 09:28)  Troponin I, Serum: 0.067 ng/mL (01-12 @ 06:15)                          15.5   4.4   )-----------( 120      ( 12 Jan 2017 09:28 )             51.8     12 Jan 2017 09:28    140    |  97     |  18     ----------------------------<  130    4.1     |  36     |  0.94     Ca    8.8        12 Jan 2017 09:28  Mg     1.7       12 Jan 2017 09:28    TPro  7.3    /  Alb  3.0    /  TBili  1.3    /  DBili  x      /  AST  19     /  ALT  28     /  AlkPhos  53     12 Jan 2017 09:28    proBNP: Serum Pro-Brain Natriuretic Peptide: 4075 pg/mL (01-12 @ 09:28)    Lipid Profile:   HgA1c: Hemoglobin A1C, Whole Blood: 6.4 % (01-12 @ 06:15)    TSH: Thyroid Stimulating Hormone, Serum: 1.620 uIU/mL (01-12 @ 06:15)    PT/INR - ( 12 Jan 2017 09:28 )   PT: 15.0 sec;   INR: 1.35          PTT - ( 12 Jan 2017 09:28 )  PTT:28.3 sec    ASSESSMENT/PLAN: 	        DVT ppx:  Dispo: Interventional Cardiology PA Adult Progress Note    Subjective Assessment: Pt seen this morning when rapid response called around 8:45ish. Pt was found to be unresponsive. Upon arrival to the ER, pt was more responsive. Neuro exam intact, /100, HR 90s, Temp 97.9F. Lab work sent STAT. UTox STAT. Dr. Marc arrived at bedside and agreed w/ plan. CT head ordered STAT- neg. . Pt become fully responsive, A+Ox3 after 10 mins. Pt stated she is really tired and didn't get enough sleep.   	  MEDICATIONS:  amLODIPine   Tablet 5milliGRAM(s) Oral daily  furosemide    Tablet 40milliGRAM(s) Oral daily  metolazone 2.5milliGRAM(s) Oral daily  metoprolol 25milliGRAM(s) Oral two times a day        ondansetron Injectable 4milliGRAM(s) IV Push once PRN    pantoprazole    Tablet 40milliGRAM(s) Oral before breakfast  famotidine    Tablet 20milliGRAM(s) Oral daily    atorvastatin 10milliGRAM(s) Oral at bedtime    sodium chloride 0.9%. 1000milliLiter(s) IV Continuous <Continuous>  aspirin  chewable 81milliGRAM(s) Oral daily      	    [PHYSICAL EXAM:  TELEMETRY:  T(C): 36.9, Max: 37.1 (01-11 @ 20:15)  HR: 105 (81 - 105)  BP: 145/89 (109/71 - 151/90)  RR: 18 (16 - 18)  SpO2: 100% (93% - 100%)  Wt(kg): --  I&O's Summary      [place]:  Central/PICC/Mid Line:                                         Appearance: Normal	  Neck: Supple, no JVD   Cardiovascular: RRR, S1 S2, no murmurs   Respiratory: Lungs clear to auscultation, decrease BS at bases 	  Gastrointestinal:  +BS soft, obese, NT/ND   Extremities: Normal range of motion, No edema b/l   Vascular: Peripheral pulses palpable 2+ bilaterally  Neurologic: Non-focal  Psychiatry: A & O x 3, Mood & affect appropriate      	    	    LABS:	 	  CARDIAC MARKERS:  Troponin I, Serum: 0.064 ng/mL (01-12 @ 09:28)  Troponin I, Serum: 0.067 ng/mL (01-12 @ 06:15)          Troponin I, Serum: 0.064 ng/mL (01-12 @ 09:28)  Troponin I, Serum: 0.067 ng/mL (01-12 @ 06:15)                          15.5   4.4   )-----------( 120      ( 12 Jan 2017 09:28 )             51.8     12 Jan 2017 09:28    140    |  97     |  18     ----------------------------<  130    4.1     |  36     |  0.94     Ca    8.8        12 Jan 2017 09:28  Mg     1.7       12 Jan 2017 09:28    TPro  7.3    /  Alb  3.0    /  TBili  1.3    /  DBili  x      /  AST  19     /  ALT  28     /  AlkPhos  53     12 Jan 2017 09:28    proBNP: Serum Pro-Brain Natriuretic Peptide: 4075 pg/mL (01-12 @ 09:28)    Lipid Profile:   HgA1c: Hemoglobin A1C, Whole Blood: 6.4 % (01-12 @ 06:15)    TSH: Thyroid Stimulating Hormone, Serum: 1.620 uIU/mL (01-12 @ 06:15)    PT/INR - ( 12 Jan 2017 09:28 )   PT: 15.0 sec;   INR: 1.35          PTT - ( 12 Jan 2017 09:28 )  PTT:28.3 sec        DVT ppx: SCDs ordered.     Dispo: Will con't to monitor overnight.

## 2020-12-12 NOTE — NUR
HOME MEDICATIONS ADMINISTEREED. PT TOELRATED WELL. ER TELE MONITORING NOTIFED
OF ADMINISTRATION OF FLECAINIDE. RESPIRATIONS REMAINS EVEN AND UNLABORED ON
ROOM AIR. PT DENIES OF ANY PAINS. PT DOES REPORT PALPITATIONS ON AND OFF. MD
NOTIFED. ALL SAFETY PRECAUTIONS ARE IN PLACE WITH CALL LIGHT IN REACH. WILL
CONTINUE TO MONITOR

## 2020-12-12 NOTE — NUR
RESTING IN BED, WATCHING TV. TALKATIVE AND PLEASANT. RESP NON-LABOREDAT REST.
BREATH SOUNDS DIMIISHED THROUGHOUT LUNG PRETTY, ESSENTIALLY CLEAR. NON-PITTING
EDEMA OF BLE. PERIPERAL PULSES WEAKLY PALPABLE. IV IN LAC, CORDARONE GTT AT
0.5 MG AMD MS AT 20 ML/HR. IV SITE BENIGN. MONITOR SHOWS AFIB WITH VARIABLE
HR. DISCUSSED PLAN OF CARE. DENIES NEEDS AT THIS TIME. CALL BELL IN REACH.

## 2020-12-12 NOTE — NUR
PT RECEIVED FROM MED/SURG VIA BED ACCOMPANIED BY DORON HYDE.  PT IS
ALERT, ORIENTED X 3.  LUNGS CLEAR THROUGHOUT, RA.  MONITOR SHOWS ATRIAL
FIBRILLATION AT RATE IN THE 80-90s.  AMIODARONE DRIP STARTED AT THIS TIME,
RATE 1 MG/MIN FOR 6 HOURS.  BLOOD PRESSURE LOW, BUT STILL ALLOWS FOR MED
ADMINISTRATION.

## 2020-12-12 NOTE — NUR
PT PROVIDED ZOFRAN FOR ANXIETY, "HEEBIE JEEBIES", STATES IMPROVED.  PT DID
HAVE URINE, BUT SUCTION CANNISTER DID NOT WORK PROPERLY, TO BE CLEANED.

## 2020-12-12 NOTE — NUR
PT ARRIVED TO [facility] ROOM 276 VIA STRETCHER ACCOMPAINED BY MARY ELLEN SPANN. PT
AMBULATED TO STRETCHER TO BED WITH LITTLE DIFFICULTY. INTRODUCED SELF TO PT
AND DISCUSSED POC. PT IS A/O X3 AND ABLE TO VOICE NEEDS. ASSESSMENT AND VITALS
COMPLETED. /58, , O2 97% ON ROOM AIR. RESPIRATIONS ARE EVEN AND
UNLABORED WITH NO SIGNS OF DISTRESS. HEART RHYTHM IS NORMAL WITH TELE IN
PLACE, AFIB PER ER MONITORING. BOWEL SOUNDS ARE ACTIVE IN ALL QUADRNTS, LAST
REPORETD BM 12/11/2020. RADIAL PULSES STRONG. PEDAL PULSES WEAK. TRACE EDEMA
NOTED TO BILATERAL LOWER EXTREMITIES. #20G EMS IN LAC FLUSHED, SITE APPEARS
HEALTHY AND PATENT. PT NOTFIED OF NEEDED URINE SAMPLE. PT REQUEST PUREWHICK TO
BE PLACE. WRITER NOTFIED OF ALLERGIES, ALLERGY AND FALL RISK BAND APPLIED. PT
DENIES OF ANY PAINS OR DISCOMFORTS AT THIS TIME. ALL SAFTEY PRECAUTIONS ARE IN
PLACE WITH CALL LIGHT IN REACH. WILL CONTINUE TO MONITOR.

## 2020-12-12 NOTE — NUR
DAUGHTER CALLED BACK TO SPEAK TO ME...WANTS TO HAVE MOTHER TRANSFERRED TO
[facility]...ADVISED THAT SHE WOULD HAVE TO SPEAK TO HER DR NOW THAT SHE WAS ADMITTED.
ADVISED THAT HER MOTHER DOESN'T MEET CRITERIA FOR TRANSFER.

## 2020-12-13 VITALS — DIASTOLIC BLOOD PRESSURE: 64 MMHG | SYSTOLIC BLOOD PRESSURE: 135 MMHG

## 2020-12-13 VITALS — SYSTOLIC BLOOD PRESSURE: 120 MMHG | DIASTOLIC BLOOD PRESSURE: 65 MMHG

## 2020-12-13 VITALS — DIASTOLIC BLOOD PRESSURE: 64 MMHG | SYSTOLIC BLOOD PRESSURE: 116 MMHG

## 2020-12-13 VITALS — SYSTOLIC BLOOD PRESSURE: 140 MMHG | DIASTOLIC BLOOD PRESSURE: 71 MMHG

## 2020-12-13 VITALS — DIASTOLIC BLOOD PRESSURE: 70 MMHG | SYSTOLIC BLOOD PRESSURE: 136 MMHG

## 2020-12-13 VITALS — SYSTOLIC BLOOD PRESSURE: 133 MMHG | DIASTOLIC BLOOD PRESSURE: 68 MMHG

## 2020-12-13 VITALS — SYSTOLIC BLOOD PRESSURE: 148 MMHG | DIASTOLIC BLOOD PRESSURE: 78 MMHG

## 2020-12-13 VITALS — DIASTOLIC BLOOD PRESSURE: 69 MMHG | SYSTOLIC BLOOD PRESSURE: 107 MMHG

## 2020-12-13 VITALS — SYSTOLIC BLOOD PRESSURE: 115 MMHG | DIASTOLIC BLOOD PRESSURE: 61 MMHG

## 2020-12-13 VITALS — DIASTOLIC BLOOD PRESSURE: 59 MMHG | SYSTOLIC BLOOD PRESSURE: 123 MMHG

## 2020-12-13 VITALS — SYSTOLIC BLOOD PRESSURE: 121 MMHG | DIASTOLIC BLOOD PRESSURE: 65 MMHG

## 2020-12-13 VITALS — SYSTOLIC BLOOD PRESSURE: 110 MMHG | DIASTOLIC BLOOD PRESSURE: 56 MMHG

## 2020-12-13 VITALS — DIASTOLIC BLOOD PRESSURE: 65 MMHG | SYSTOLIC BLOOD PRESSURE: 122 MMHG

## 2020-12-13 VITALS — DIASTOLIC BLOOD PRESSURE: 65 MMHG | SYSTOLIC BLOOD PRESSURE: 135 MMHG

## 2020-12-13 VITALS — SYSTOLIC BLOOD PRESSURE: 137 MMHG | DIASTOLIC BLOOD PRESSURE: 73 MMHG

## 2020-12-13 VITALS — DIASTOLIC BLOOD PRESSURE: 53 MMHG | SYSTOLIC BLOOD PRESSURE: 119 MMHG

## 2020-12-13 VITALS — DIASTOLIC BLOOD PRESSURE: 64 MMHG | SYSTOLIC BLOOD PRESSURE: 119 MMHG

## 2020-12-13 VITALS — SYSTOLIC BLOOD PRESSURE: 141 MMHG | DIASTOLIC BLOOD PRESSURE: 64 MMHG

## 2020-12-13 VITALS — SYSTOLIC BLOOD PRESSURE: 130 MMHG | DIASTOLIC BLOOD PRESSURE: 55 MMHG

## 2020-12-13 LAB
ANION GAP SERPL CALCULATED.3IONS-SCNC: 8 MMOL/L
BUN SERPL-MCNC: 14 MG/DL (ref 8–23)
BUN/CREAT SERPL: 20
CHLORIDE SERPL-SCNC: 109 MMOL/L (ref 95–108)
CO2 SERPL-SCNC: 27 MMOL/L (ref 22–30)
CREAT SERPL-MCNC: 0.7 MG/DL (ref 0.5–1)
MAGNESIUM SERPL-MCNC: 1.9 MG/DL (ref 1.6–2.3)
POTASSIUM SERPL-SCNC: 4.2 MMOL/L (ref 3.5–5.1)
SODIUM SERPL-SCNC: 139 MMOL/L (ref 137–146)

## 2020-12-13 NOTE — NUR
NO CHANGES TO REPORT. RESTING QUIETLY WITH EYES CLOSED. RESP NON-LABORED.
MONITOR SR. AMIODARONE CONTINUES TO INFUSE AT 0.5 MG/MIN.

## 2020-12-13 NOTE — NUR
PT A&OX4, ABLE TO MAKE NEEDS KNOWN. REMAINS SR ON TELEMETRY, HR 60. PT DENIES
CP, SOB OR DISTRESS AT THIS TIME. REPSIRATIONS EVEN/UNLABORED, LS DIMINISHED
THROUGHOUT, PUREWICK IN PLACE, ABDOMEN DISTENDED, NON- TENDER, BSX4 ACTIVE.
AMIODORONE INFUSING LAC, NO S/S OF INFILTRATION NOTED AT SITE. PT STATED BACK
PAIN 8/10, WILL MEDICATE AS ORDERED. CALL LIGHT IN REACH. WILL MONITOR.

## 2020-12-13 NOTE — NUR
PT C/O PAIN AT L AC IV SITE.  REDNESS NOTED MEDIAL TO SITE.  REMOVED IV SITE,
STARTED NEW IV #22 R WRIST.

## 2020-12-13 NOTE — NUR
PT AWAKE ALERT WATCHING TV.  SR 60.  PT REQUESTED ZOFRAN FOR NAUSEA, MEDICATED
PER MAR.  CALL PITT IN REACH.

## 2020-12-14 VITALS — DIASTOLIC BLOOD PRESSURE: 55 MMHG | SYSTOLIC BLOOD PRESSURE: 117 MMHG

## 2020-12-14 VITALS — DIASTOLIC BLOOD PRESSURE: 68 MMHG | SYSTOLIC BLOOD PRESSURE: 159 MMHG

## 2020-12-14 VITALS — DIASTOLIC BLOOD PRESSURE: 85 MMHG | SYSTOLIC BLOOD PRESSURE: 119 MMHG

## 2020-12-14 VITALS — SYSTOLIC BLOOD PRESSURE: 101 MMHG | DIASTOLIC BLOOD PRESSURE: 50 MMHG

## 2020-12-14 VITALS — SYSTOLIC BLOOD PRESSURE: 125 MMHG | DIASTOLIC BLOOD PRESSURE: 88 MMHG

## 2020-12-14 VITALS — SYSTOLIC BLOOD PRESSURE: 135 MMHG | DIASTOLIC BLOOD PRESSURE: 57 MMHG

## 2020-12-14 VITALS — DIASTOLIC BLOOD PRESSURE: 69 MMHG | SYSTOLIC BLOOD PRESSURE: 127 MMHG

## 2020-12-14 VITALS — DIASTOLIC BLOOD PRESSURE: 58 MMHG | SYSTOLIC BLOOD PRESSURE: 118 MMHG

## 2020-12-14 VITALS — DIASTOLIC BLOOD PRESSURE: 81 MMHG | SYSTOLIC BLOOD PRESSURE: 158 MMHG

## 2020-12-14 VITALS — SYSTOLIC BLOOD PRESSURE: 122 MMHG | DIASTOLIC BLOOD PRESSURE: 58 MMHG

## 2020-12-14 VITALS — DIASTOLIC BLOOD PRESSURE: 72 MMHG | SYSTOLIC BLOOD PRESSURE: 153 MMHG

## 2020-12-14 VITALS — DIASTOLIC BLOOD PRESSURE: 59 MMHG | SYSTOLIC BLOOD PRESSURE: 121 MMHG

## 2020-12-14 VITALS — DIASTOLIC BLOOD PRESSURE: 76 MMHG | SYSTOLIC BLOOD PRESSURE: 129 MMHG

## 2020-12-14 VITALS — DIASTOLIC BLOOD PRESSURE: 64 MMHG | SYSTOLIC BLOOD PRESSURE: 114 MMHG

## 2020-12-14 VITALS — SYSTOLIC BLOOD PRESSURE: 113 MMHG | DIASTOLIC BLOOD PRESSURE: 57 MMHG

## 2020-12-14 VITALS — SYSTOLIC BLOOD PRESSURE: 141 MMHG | DIASTOLIC BLOOD PRESSURE: 62 MMHG

## 2020-12-14 VITALS — DIASTOLIC BLOOD PRESSURE: 116 MMHG | SYSTOLIC BLOOD PRESSURE: 136 MMHG

## 2020-12-14 VITALS — DIASTOLIC BLOOD PRESSURE: 70 MMHG | SYSTOLIC BLOOD PRESSURE: 134 MMHG

## 2020-12-14 VITALS — SYSTOLIC BLOOD PRESSURE: 128 MMHG | DIASTOLIC BLOOD PRESSURE: 71 MMHG

## 2020-12-14 VITALS — DIASTOLIC BLOOD PRESSURE: 76 MMHG | SYSTOLIC BLOOD PRESSURE: 120 MMHG

## 2020-12-14 NOTE — NUR
awake. watching tv. nad. cardiac monitor shows sinus rhythm hr 62. #22 rt
wrist saline lock. po fluids taken well. purewick cath in place. urine clear
yellow. fall precautions cont. heat pack applied to lac per request.

## 2020-12-14 NOTE — NUR
PATIENT MOVED TO ICU BED 7 DUE TO SHE COMPLAINS OF ROOM BEING COLD AND SHE IS
NOT ABLE TO EAT IN A COLD ROOM, HAS ARTHRITIS PAIN, AND HER SINUSES. ALL
BELONGINGS WITH PATIENT. CALL LIGHT WITHIN REACH.

## 2020-12-14 NOTE — NUR
ASSISTED WITH SETTING UP DINNER TRAY, PATIENT ABLE TO PULL HERSELF UP. ALSO
COMPLAINS OF LEFT UPPER ARM PAIN FROM LAC SITE, IT IS RED/SWOLLEN, HEAT PACKS
PROVIDED AND WILL APPLY AFTER SHE IS DONE WITH HER DINNER.

## 2020-12-14 NOTE — NUR
PATIENT AWAKE, ORIENTED X4. PATIENT COMPLAINS OF ROOM BEING COLD, SHE HAS
R-KNEE ARTHRITIS PAIN. NURSE ASSESSMENT PERFORMED. PATIENT ABLE TO PULL SELF
UP IN BED. DOPPLER USED FOR LEFT PEDAL PULSE, PRESENT. R-WRIST IV INTACT,
FLUSHES PROPERLY, SALINE LOCKED. SR ON TELEMETRY. PUREWICK IN PLACE, URINE
YELLOW/CLEAR. DISCUSSED POC. NOTIFIED PATIENT I WILL PROVIDE PAIN MEDICATION,
AS SOON AS I CAN WILL CALL MAINTENANCE FOR COLD AIR IN ROOM, AND PROVIDE A
HEAT PACK FOR THE RED, SWOLLEN MAGI FROM PRIOR EMS IV SITE REMOVED.

## 2020-12-14 NOTE — NUR
PATIENT CALLED TO [facility] ER FOR COMPLAINT OF HEPA FILTER IS LOUD AND IS GIVING
HER A HEADACHE. HEPAFILTER TURNED DOWN TO LOW.

## 2020-12-14 NOTE — NUR
HUMBLE NOW SITS ON RECLINER, WAS ABLE TO TRANSFER WITH WALKER. NO ACUTE
DISTRESS SHOWN. CALL LIGHT WITHIN REACH.-

## 2020-12-15 VITALS — DIASTOLIC BLOOD PRESSURE: 100 MMHG | SYSTOLIC BLOOD PRESSURE: 123 MMHG

## 2020-12-15 VITALS — DIASTOLIC BLOOD PRESSURE: 68 MMHG | SYSTOLIC BLOOD PRESSURE: 128 MMHG

## 2020-12-15 VITALS — SYSTOLIC BLOOD PRESSURE: 109 MMHG | DIASTOLIC BLOOD PRESSURE: 67 MMHG

## 2020-12-15 VITALS — SYSTOLIC BLOOD PRESSURE: 118 MMHG | DIASTOLIC BLOOD PRESSURE: 97 MMHG

## 2020-12-15 VITALS — DIASTOLIC BLOOD PRESSURE: 79 MMHG | SYSTOLIC BLOOD PRESSURE: 148 MMHG

## 2020-12-15 VITALS — DIASTOLIC BLOOD PRESSURE: 74 MMHG | SYSTOLIC BLOOD PRESSURE: 117 MMHG

## 2020-12-15 VITALS — SYSTOLIC BLOOD PRESSURE: 133 MMHG | DIASTOLIC BLOOD PRESSURE: 88 MMHG

## 2020-12-15 VITALS — SYSTOLIC BLOOD PRESSURE: 135 MMHG | DIASTOLIC BLOOD PRESSURE: 59 MMHG

## 2020-12-15 VITALS — SYSTOLIC BLOOD PRESSURE: 144 MMHG | DIASTOLIC BLOOD PRESSURE: 96 MMHG

## 2020-12-15 VITALS — SYSTOLIC BLOOD PRESSURE: 134 MMHG | DIASTOLIC BLOOD PRESSURE: 61 MMHG

## 2020-12-15 VITALS — DIASTOLIC BLOOD PRESSURE: 105 MMHG | SYSTOLIC BLOOD PRESSURE: 138 MMHG

## 2020-12-15 VITALS — DIASTOLIC BLOOD PRESSURE: 64 MMHG | SYSTOLIC BLOOD PRESSURE: 131 MMHG

## 2020-12-15 VITALS — SYSTOLIC BLOOD PRESSURE: 161 MMHG | DIASTOLIC BLOOD PRESSURE: 87 MMHG

## 2020-12-15 VITALS — DIASTOLIC BLOOD PRESSURE: 80 MMHG | SYSTOLIC BLOOD PRESSURE: 143 MMHG

## 2020-12-15 VITALS — DIASTOLIC BLOOD PRESSURE: 70 MMHG | SYSTOLIC BLOOD PRESSURE: 140 MMHG

## 2020-12-15 VITALS — DIASTOLIC BLOOD PRESSURE: 102 MMHG | SYSTOLIC BLOOD PRESSURE: 149 MMHG

## 2020-12-15 VITALS — SYSTOLIC BLOOD PRESSURE: 127 MMHG | DIASTOLIC BLOOD PRESSURE: 85 MMHG

## 2020-12-15 VITALS — DIASTOLIC BLOOD PRESSURE: 85 MMHG | SYSTOLIC BLOOD PRESSURE: 127 MMHG

## 2020-12-15 NOTE — NUR
awake. watching tv. denies distress. cardiac monitor shows a flutter hr 136.
#22 rt wrist saline lock. po fluids taken well. pure wick cath in place. urine
clear yellow. fall precautions cont.

## 2020-12-15 NOTE — NUR
NURSE PRACTITIONER SASKIA NOTIFIED OF RHYTHM CHANGE THIS MORNING ON PATIENT.
EKG ORDERED FOR RHYTHM CHANGE.

## 2020-12-15 NOTE — NUR
PATIENT LAYS IN [employer]. COMPLAINS OF NAUSEA, SHE "MIGHT NEED TO HAVE A
BM." CALL LIGHT WITHIN REACH.

## 2020-12-15 NOTE — NUR
DR LUND NOTIFIED OF PATIENT RHYTHM CONTINUES TO BE IN ATRIAL FLUTTER AND
HEART RATE 120'S 'S. NEW ORDERS RECEIVED.

## 2020-12-16 VITALS — SYSTOLIC BLOOD PRESSURE: 114 MMHG | DIASTOLIC BLOOD PRESSURE: 65 MMHG

## 2020-12-16 VITALS — DIASTOLIC BLOOD PRESSURE: 70 MMHG | SYSTOLIC BLOOD PRESSURE: 115 MMHG

## 2020-12-16 VITALS — DIASTOLIC BLOOD PRESSURE: 71 MMHG | SYSTOLIC BLOOD PRESSURE: 128 MMHG

## 2020-12-16 VITALS — SYSTOLIC BLOOD PRESSURE: 141 MMHG | DIASTOLIC BLOOD PRESSURE: 77 MMHG

## 2020-12-16 VITALS — DIASTOLIC BLOOD PRESSURE: 61 MMHG | SYSTOLIC BLOOD PRESSURE: 124 MMHG

## 2020-12-16 VITALS — SYSTOLIC BLOOD PRESSURE: 108 MMHG | DIASTOLIC BLOOD PRESSURE: 70 MMHG

## 2020-12-16 VITALS — SYSTOLIC BLOOD PRESSURE: 119 MMHG | DIASTOLIC BLOOD PRESSURE: 70 MMHG

## 2020-12-16 VITALS — SYSTOLIC BLOOD PRESSURE: 118 MMHG | DIASTOLIC BLOOD PRESSURE: 82 MMHG

## 2020-12-16 VITALS — DIASTOLIC BLOOD PRESSURE: 87 MMHG | SYSTOLIC BLOOD PRESSURE: 111 MMHG

## 2020-12-16 VITALS — DIASTOLIC BLOOD PRESSURE: 68 MMHG | SYSTOLIC BLOOD PRESSURE: 102 MMHG

## 2020-12-16 VITALS — DIASTOLIC BLOOD PRESSURE: 73 MMHG | SYSTOLIC BLOOD PRESSURE: 137 MMHG

## 2020-12-16 VITALS — DIASTOLIC BLOOD PRESSURE: 57 MMHG | SYSTOLIC BLOOD PRESSURE: 123 MMHG

## 2020-12-16 VITALS — DIASTOLIC BLOOD PRESSURE: 61 MMHG | SYSTOLIC BLOOD PRESSURE: 108 MMHG

## 2020-12-16 VITALS — DIASTOLIC BLOOD PRESSURE: 69 MMHG | SYSTOLIC BLOOD PRESSURE: 140 MMHG

## 2020-12-16 VITALS — DIASTOLIC BLOOD PRESSURE: 77 MMHG | SYSTOLIC BLOOD PRESSURE: 136 MMHG

## 2020-12-16 VITALS — SYSTOLIC BLOOD PRESSURE: 156 MMHG | DIASTOLIC BLOOD PRESSURE: 74 MMHG

## 2020-12-16 VITALS — SYSTOLIC BLOOD PRESSURE: 146 MMHG | DIASTOLIC BLOOD PRESSURE: 74 MMHG

## 2020-12-16 VITALS — DIASTOLIC BLOOD PRESSURE: 73 MMHG | SYSTOLIC BLOOD PRESSURE: 136 MMHG

## 2020-12-16 VITALS — SYSTOLIC BLOOD PRESSURE: 116 MMHG | DIASTOLIC BLOOD PRESSURE: 78 MMHG

## 2020-12-16 LAB
ANION GAP SERPL CALCULATED.3IONS-SCNC: 9 MMOL/L
BUN SERPL-MCNC: 11 MG/DL (ref 8–23)
BUN/CREAT SERPL: 17
CHLORIDE SERPL-SCNC: 105 MMOL/L (ref 95–108)
CO2 SERPL-SCNC: 29 MMOL/L (ref 22–30)
CREAT SERPL-MCNC: 0.6 MG/DL (ref 0.5–1)
MAGNESIUM SERPL-MCNC: 1.9 MG/DL (ref 1.6–2.3)
POTASSIUM SERPL-SCNC: 4.4 MMOL/L (ref 3.5–5.1)
SODIUM SERPL-SCNC: 138 MMOL/L (ref 137–146)

## 2020-12-16 NOTE — NUR
CALL RECEIVED FROM DANO AT [facility]. PT TO BE TRANSPORTED TO [facility] BY 8AM FOR CATH LAB HOLDING. CASE MANAGEMENT NOTIFIED.

## 2020-12-16 NOTE — NUR
PT RESTING IN SEMI FOWLERS POSITION,A&O X3;VS OBTAINED AND ASSESSMENT
COMPLETED;PT DENIES ANY CURRENT PAIN OR DISCOMFORTS,PAIN SCALE AND REPORTING
EDUCATED;RESPIRATIONS EVEN AND UNLABORED ON RA,CLEAR LUNG SOUNDS;ABDOMEN SOFT
ON PALPATION AND ACTIVE IN ALL 4 QUADRANTS;PUREWICK CATHETER IN PLACE DRAINING
CLEAR/YELLOW URINE;WEAK PEDAL PULSES;GENERALIZED BRUISING NOTED THROUGHOUT AND
REDDENING NOTED TO ABDOMINAL FOLDS,SKIN OTHERWISE INTACT;CARDIAC MONITORING IN
PLACE READING AFLUTTER 90'S-100'S;#22G TO RIGHT WRIST FLUSHED AND PATENT,SITE
APPEARS HEALTHY;PT DENIES ANY ADDITIONAL NEEDS AT THIS TIME AND IS ENCOURAGED
TO CALL FOR ASSISTANCE IF NEEDED;FALL PRECAUTIONS IN PLACE WITH BED IN THE
LOWEST POSITION AND CALL LIGHT IN REACH;FREQUENT ROUNDS MADE.

## 2020-12-16 NOTE — NUR
PT RESTING IN SEMI FOWLERS POSITION;RESPIRATIONS EVEN AND UNLABORED ON RA;PT
DENIES ANY CURRENT PAIN OR DISCOMFORTS;CARDIAC MONITORING IN PLACE READING
AFLUTTER 70'-80'S;IV SITE PATENT;INFORMED CONSENT OBTAINED AT THIS TIME FOR
TRANSFER TO [facility], ALL QUESTIONS ANSWERED AND PT VERBALIZES
UNDERSTANDING;PT DENIES ANY ADDITIONAL NEEDS;CALL LIGHT IN REACH;FREQUENT
ROUNDS MADE.

## 2020-12-16 NOTE — NUR
CALL RECEIVED FROM . PT TO BE TRANSPORTED [facility] WITH
ACCEPTING MD DUMONT FOR CARDIAC ABLATION.

## 2020-12-16 NOTE — NUR
SPOKE WITH PATEINT ABOUT NOTIFYING HER FAMILY ABOUT TRANSFER. PT REPORTS THAT
HER DAUGHTER CAN NOT TALK DUE TO RECENT SX AND THAT SHE HAS NOTIFIED THEM VIA
TEXT. PT REPORTS THAT THERE IS NO NEED FOR WRITTER TO CALL REGARDING POC.

## 2020-12-16 NOTE — NUR
CALL RECEIVED FROM ROMAN AT [facility] REQUESTING FACE SHEET. CASE
MANAGEMENT NOTIFIED OF TRANSFER. FACE SHEET TO BE FAX BY CASE MANAGEMENT.

## 2020-12-16 NOTE — NUR
PT RESTING IN SEMI FOWLERS POSITION;RESPIRATIONS EVEN AND UNLABORED ON RA;PT
DENIES ANY CURRENT NEEDS;IV SITE PATENT;PUREWICK IN PLACE;DINNER TRAY
PROVIDED;PT DENIES ANY ADDITIONAL NEEDS AND IS ENCOURAGED TO CALL FOR
ASSISTANCE IF NEEDED;CALL LIGHT IN REACH;FREQUENT ROUNDS MADE.

## 2020-12-16 NOTE — NUR
PT IN BED WITH EYES CLOSED, RESPONDS TO VERBAL STIMULI.  PT IN A FIB AT 78.
NO DISTRESS NOTED.  CALL BELL IN REACH.

## 2020-12-16 NOTE — NUR
PT REPORTS GENERALIZED PAIN RATING 8/10 ON THE PAIN SCALE AND REQUESTS PAIN
MEDICATION, PT TO BE MEDICATED WITH PRN ULTRAM 50MG PO;RESPIRATIONS EVEN AND
UNLABORED ON RA;HEART RATE AFLUTTER 70'S ON THE MONITOR;PT DENIES ANY
ADDITIONAL NEEDS;CALL LIGHT IN REACH;FREQUENT ROUNDS MADE.

## 2020-12-16 NOTE — NUR
NOTIFIED OF ELEVATED HEART RATE. AFLUTTER 115'S-160'S. PT REPORTS
SOB AT TIMES BUT DENIES ANY PAIN OR DISCOMFORTS. NO NEW ORDERS
RECEIVED.FREQUENT ROUNDS MADE.

## 2020-12-16 NOTE — NUR
PT REPORTS BACK PAIN RATING 9/10 ON THE PAIN SCALE AND REQUESTS PAIN
MEDICATION, PT TO BE MEDICATED WITH PRN TYLENOL 650MG PO AT THIS TIME;WILL
CONTINUE TO MONITOR FOR EFFECTIVENESS

## 2020-12-16 NOTE — NUR
PT RESTING IN SEMI FOWLERS POSITION;RESPIRATIONS REMAIN EVEN AND UNLABORED ON
RA;PT DENIES ANY CURRENT PAIN OR DISCOMFORTS;IV SITE PATENT;CARDIAC MONITORING
IN PLACE READING AFLUTTER 90'S-130'S;PT MEDICATED WITH X1 LOPRESSOR 50MG PO
PER MD ORDER;PT DENIES ANY ADDITIONAL NEEDS AT THIS TIME;ENCOURAGED TO CALL
FOR ASSISTANCE IF NEEDED;CALL LIGHT WITHIN REACH;FREQUENT ROUNDS MADE.

## 2020-12-16 NOTE — NUR
CALL RECEIVED FROM . ORDER OBTAINED TO ADMINISTER METOPROLOL 50MG PO
X1 NOW.PT NOTIFIED AND VERBALIZES UNDERSTANDING. HEART RATE REMAINS AFLUTTER
100'S-150'S;FREQUENT ROUNDS MADE.

## 2020-12-17 VITALS — SYSTOLIC BLOOD PRESSURE: 119 MMHG | DIASTOLIC BLOOD PRESSURE: 72 MMHG

## 2020-12-17 VITALS — SYSTOLIC BLOOD PRESSURE: 120 MMHG | DIASTOLIC BLOOD PRESSURE: 83 MMHG

## 2020-12-17 VITALS — SYSTOLIC BLOOD PRESSURE: 132 MMHG | DIASTOLIC BLOOD PRESSURE: 62 MMHG

## 2020-12-17 NOTE — NUR
[facility] AT BEDSIDE.  PT TRANSFERRED TO [facility] STRETCHER WITHOUT
INCIDENT.  PT HAS ALL BELONGINGS.

## 2021-05-15 ENCOUNTER — HOSPITAL ENCOUNTER (EMERGENCY)
Dept: HOSPITAL 82 - ED | Age: 81
Discharge: HOME | End: 2021-05-15
Payer: MEDICARE

## 2021-05-15 VITALS — BODY MASS INDEX: 38.36 KG/M2 | WEIGHT: 190.26 LBS | HEIGHT: 59 IN

## 2021-05-15 VITALS — DIASTOLIC BLOOD PRESSURE: 64 MMHG | SYSTOLIC BLOOD PRESSURE: 134 MMHG

## 2021-05-15 DIAGNOSIS — Z95.0: ICD-10-CM

## 2021-05-15 DIAGNOSIS — E11.9: ICD-10-CM

## 2021-05-15 DIAGNOSIS — I10: ICD-10-CM

## 2021-05-15 DIAGNOSIS — I48.91: ICD-10-CM

## 2021-05-15 DIAGNOSIS — R53.1: Primary | ICD-10-CM

## 2021-05-15 DIAGNOSIS — R60.0: ICD-10-CM

## 2021-05-15 LAB
ALBUMIN SERPL-MCNC: 3.8 G/DL (ref 3.2–5)
ALP SERPL-CCNC: 74 U/L (ref 38–126)
ANION GAP SERPL CALCULATED.3IONS-SCNC: 14 MMOL/L
AST SERPL-CCNC: 19 U/L (ref 9–36)
BASOPHILS NFR BLD AUTO: 1 % (ref 0–3)
BILIRUB UR QL STRIP.AUTO: NEGATIVE
BUN SERPL-MCNC: 9 MG/DL (ref 8–23)
BUN/CREAT SERPL: 10
CHLORIDE SERPL-SCNC: 99 MMOL/L (ref 95–108)
CO2 SERPL-SCNC: 27 MMOL/L (ref 22–30)
COLOR UR AUTO: YELLOW
CREAT SERPL-MCNC: 0.9 MG/DL (ref 0.5–1)
EOSINOPHIL NFR BLD AUTO: 1 % (ref 0–8)
ERYTHROCYTE [DISTWIDTH] IN BLOOD BY AUTOMATED COUNT: 14.6 % (ref 11.5–15.5)
GLUCOSE UR STRIP.AUTO-MCNC: NEGATIVE MG/DL
HCT VFR BLD AUTO: 38.8 % (ref 37–47)
HGB BLD-MCNC: 11.9 G/DL (ref 12–16)
HGB UR QL STRIP.AUTO: NEGATIVE
IMM GRANULOCYTES NFR BLD: 0.6 % (ref 0–5)
KETONES UR STRIP.AUTO-MCNC: NEGATIVE MG/DL
LEUKOCYTE ESTERASE UR QL STRIP.AUTO: NEGATIVE
LYMPHOCYTES NFR BLD: 24 % (ref 15–41)
MCH RBC QN AUTO: 25.9 PG  CALC (ref 26–32)
MCHC RBC AUTO-ENTMCNC: 30.7 G/DL CAL (ref 32–36)
MCV RBC AUTO: 84.5 FL  CALC (ref 80–100)
MONOCYTES NFR BLD AUTO: 8 % (ref 2–13)
MYOGLOBIN SERPL-MCNC: 36 NG/ML (ref 0–62)
NEUTROPHILS # BLD AUTO: 4.73 THOU/UL (ref 2–7.15)
NEUTROPHILS NFR BLD AUTO: 66 % (ref 42–76)
NITRITE UR QL STRIP.AUTO: NEGATIVE
PH UR STRIP.AUTO: 6 [PH] (ref 4.5–8)
PLATELET # BLD AUTO: 342 THOU/UL (ref 130–400)
POTASSIUM SERPL-SCNC: 3.3 MMOL/L (ref 3.5–5.1)
PROT SERPL-MCNC: 6.7 G/DL (ref 6.3–8.2)
PROT UR QL STRIP.AUTO: NEGATIVE MG/DL
RBC # BLD AUTO: 4.59 MILL/UL (ref 4.2–5.6)
SODIUM SERPL-SCNC: 136 MMOL/L (ref 137–146)
SP GR UR STRIP.AUTO: <=1.005
UROBILINOGEN UR QL STRIP.AUTO: 0.2 E.U./DL

## 2021-08-11 NOTE — NUR
TRANSPORTATION BY [facility] SET UP. SPOKE WITH ELY. PT TO BE PICKED UP
AT APPROX 5AM FOR TRANSPORT TO [facility]. Advancement Flap (Single) Text: The defect edges were debeveled with a #15 scalpel blade.  Given the location of the defect and the proximity to free margins a single advancement flap was deemed most appropriate.  Using a sterile surgical marker, an appropriate advancement flap was drawn incorporating the defect and placing the expected incisions within the relaxed skin tension lines where possible.    The area thus outlined was incised deep to adipose tissue with a #15 scalpel blade.  The skin margins were undermined to an appropriate distance in all directions utilizing iris scissors.

## 2021-10-24 ENCOUNTER — HOSPITAL ENCOUNTER (EMERGENCY)
Dept: HOSPITAL 82 - ED | Age: 81
Discharge: HOME | End: 2021-10-24
Payer: MEDICARE

## 2021-10-24 VITALS — SYSTOLIC BLOOD PRESSURE: 138 MMHG | DIASTOLIC BLOOD PRESSURE: 80 MMHG

## 2021-10-24 VITALS — BODY MASS INDEX: 37.16 KG/M2 | HEIGHT: 59 IN | WEIGHT: 184.31 LBS

## 2021-10-24 DIAGNOSIS — I10: ICD-10-CM

## 2021-10-24 DIAGNOSIS — M25.512: ICD-10-CM

## 2021-10-24 DIAGNOSIS — M19.90: ICD-10-CM

## 2021-10-24 DIAGNOSIS — Z95.0: ICD-10-CM

## 2021-10-24 DIAGNOSIS — I48.91: ICD-10-CM

## 2021-10-24 DIAGNOSIS — M62.838: Primary | ICD-10-CM
